# Patient Record
Sex: FEMALE | Race: WHITE | NOT HISPANIC OR LATINO | Employment: PART TIME | URBAN - METROPOLITAN AREA
[De-identification: names, ages, dates, MRNs, and addresses within clinical notes are randomized per-mention and may not be internally consistent; named-entity substitution may affect disease eponyms.]

---

## 2023-12-14 ENCOUNTER — TELEPHONE (OUTPATIENT)
Dept: TRANSPLANT | Facility: CLINIC | Age: 63
End: 2023-12-14

## 2023-12-14 DIAGNOSIS — Z79.899 POLYPHARMACY: Primary | ICD-10-CM

## 2023-12-14 DIAGNOSIS — R06.82 TACHYPNEA: ICD-10-CM

## 2023-12-14 DIAGNOSIS — I27.9 CHRONIC PULMONARY HEART DISEASE: ICD-10-CM

## 2024-01-11 ENCOUNTER — LAB VISIT (OUTPATIENT)
Dept: LAB | Facility: HOSPITAL | Age: 64
End: 2024-01-11
Attending: INTERNAL MEDICINE
Payer: COMMERCIAL

## 2024-01-11 ENCOUNTER — PATIENT MESSAGE (OUTPATIENT)
Dept: TRANSPLANT | Facility: CLINIC | Age: 64
End: 2024-01-11

## 2024-01-11 ENCOUNTER — OFFICE VISIT (OUTPATIENT)
Dept: TRANSPLANT | Facility: CLINIC | Age: 64
End: 2024-01-11
Attending: INTERNAL MEDICINE
Payer: COMMERCIAL

## 2024-01-11 ENCOUNTER — HOSPITAL ENCOUNTER (OUTPATIENT)
Dept: PULMONOLOGY | Facility: CLINIC | Age: 64
Discharge: HOME | End: 2024-01-11
Attending: INTERNAL MEDICINE
Payer: COMMERCIAL

## 2024-01-11 VITALS — BODY MASS INDEX: 26.93 KG/M2 | HEIGHT: 63 IN | WEIGHT: 152 LBS

## 2024-01-11 VITALS
HEIGHT: 63 IN | SYSTOLIC BLOOD PRESSURE: 112 MMHG | HEART RATE: 79 BPM | OXYGEN SATURATION: 98 % | DIASTOLIC BLOOD PRESSURE: 78 MMHG | WEIGHT: 159.81 LBS | BODY MASS INDEX: 28.32 KG/M2

## 2024-01-11 DIAGNOSIS — R76.8 POSITIVE ANA (ANTINUCLEAR ANTIBODY): ICD-10-CM

## 2024-01-11 DIAGNOSIS — I73.00 RAYNAUD'S DISEASE WITHOUT GANGRENE: ICD-10-CM

## 2024-01-11 DIAGNOSIS — Z85.3 HISTORY OF BREAST CANCER: ICD-10-CM

## 2024-01-11 DIAGNOSIS — E03.9 HYPOTHYROIDISM, UNSPECIFIED TYPE: ICD-10-CM

## 2024-01-11 DIAGNOSIS — R06.82 TACHYPNEA: ICD-10-CM

## 2024-01-11 DIAGNOSIS — Z95.828 PRESENCE OF IVC FILTER: ICD-10-CM

## 2024-01-11 DIAGNOSIS — Z86.718 HISTORY OF DEEP VENOUS THROMBOSIS: ICD-10-CM

## 2024-01-11 DIAGNOSIS — I27.20 PULMONARY HYPERTENSION: Primary | ICD-10-CM

## 2024-01-11 DIAGNOSIS — Z79.899 POLYPHARMACY: ICD-10-CM

## 2024-01-11 DIAGNOSIS — I27.9 CHRONIC PULMONARY HEART DISEASE: ICD-10-CM

## 2024-01-11 DIAGNOSIS — I10 PRIMARY HYPERTENSION: ICD-10-CM

## 2024-01-11 LAB
ALBUMIN SERPL BCP-MCNC: 3.5 G/DL (ref 3.5–5.2)
ALP SERPL-CCNC: 83 U/L (ref 55–135)
ALT SERPL W/O P-5'-P-CCNC: 10 U/L (ref 10–44)
ANION GAP SERPL CALC-SCNC: 10 MMOL/L (ref 8–16)
AST SERPL-CCNC: 18 U/L (ref 10–40)
BASOPHILS # BLD AUTO: 0.03 K/UL (ref 0–0.2)
BASOPHILS NFR BLD: 0.3 % (ref 0–1.9)
BILIRUB SERPL-MCNC: 0.9 MG/DL (ref 0.1–1)
BNP SERPL-MCNC: 15 PG/ML (ref 0–99)
BUN SERPL-MCNC: 12 MG/DL (ref 8–23)
CALCIUM SERPL-MCNC: 9.5 MG/DL (ref 8.7–10.5)
CHLORIDE SERPL-SCNC: 99 MMOL/L (ref 95–110)
CO2 SERPL-SCNC: 29 MMOL/L (ref 23–29)
CREAT SERPL-MCNC: 1.2 MG/DL (ref 0.5–1.4)
DIFFERENTIAL METHOD BLD: ABNORMAL
EOSINOPHIL # BLD AUTO: 0.2 K/UL (ref 0–0.5)
EOSINOPHIL NFR BLD: 2.5 % (ref 0–8)
ERYTHROCYTE [DISTWIDTH] IN BLOOD BY AUTOMATED COUNT: 11.9 % (ref 11.5–14.5)
EST. GFR  (NO RACE VARIABLE): 51.5 ML/MIN/1.73 M^2
GLUCOSE SERPL-MCNC: 71 MG/DL (ref 70–110)
HCT VFR BLD AUTO: 46.2 % (ref 37–48.5)
HGB BLD-MCNC: 15 G/DL (ref 12–16)
IMM GRANULOCYTES # BLD AUTO: 0.07 K/UL (ref 0–0.04)
IMM GRANULOCYTES NFR BLD AUTO: 0.7 % (ref 0–0.5)
LYMPHOCYTES # BLD AUTO: 1.7 K/UL (ref 1–4.8)
LYMPHOCYTES NFR BLD: 17.5 % (ref 18–48)
MAGNESIUM SERPL-MCNC: 2.2 MG/DL (ref 1.6–2.6)
MCH RBC QN AUTO: 29 PG (ref 27–31)
MCHC RBC AUTO-ENTMCNC: 32.5 G/DL (ref 32–36)
MCV RBC AUTO: 89 FL (ref 82–98)
MONOCYTES # BLD AUTO: 1.1 K/UL (ref 0.3–1)
MONOCYTES NFR BLD: 11.6 % (ref 4–15)
NEUTROPHILS # BLD AUTO: 6.6 K/UL (ref 1.8–7.7)
NEUTROPHILS NFR BLD: 67.4 % (ref 38–73)
NRBC BLD-RTO: 0 /100 WBC
PLATELET # BLD AUTO: 358 K/UL (ref 150–450)
PMV BLD AUTO: 8.7 FL (ref 9.2–12.9)
POTASSIUM SERPL-SCNC: 3.9 MMOL/L (ref 3.5–5.1)
PROT SERPL-MCNC: 7.2 G/DL (ref 6–8.4)
RBC # BLD AUTO: 5.18 M/UL (ref 4–5.4)
SODIUM SERPL-SCNC: 138 MMOL/L (ref 136–145)
WBC # BLD AUTO: 9.74 K/UL (ref 3.9–12.7)

## 2024-01-11 PROCEDURE — 3008F BODY MASS INDEX DOCD: CPT | Mod: CPTII,S$GLB,, | Performed by: INTERNAL MEDICINE

## 2024-01-11 PROCEDURE — 99204 OFFICE O/P NEW MOD 45 MIN: CPT | Mod: S$GLB,,, | Performed by: INTERNAL MEDICINE

## 2024-01-11 PROCEDURE — 1160F RVW MEDS BY RX/DR IN RCRD: CPT | Mod: CPTII,S$GLB,, | Performed by: INTERNAL MEDICINE

## 2024-01-11 PROCEDURE — 85025 COMPLETE CBC W/AUTO DIFF WBC: CPT | Performed by: INTERNAL MEDICINE

## 2024-01-11 PROCEDURE — 83735 ASSAY OF MAGNESIUM: CPT | Performed by: INTERNAL MEDICINE

## 2024-01-11 PROCEDURE — 1159F MED LIST DOCD IN RCRD: CPT | Mod: CPTII,S$GLB,, | Performed by: INTERNAL MEDICINE

## 2024-01-11 PROCEDURE — 80053 COMPREHEN METABOLIC PANEL: CPT | Performed by: INTERNAL MEDICINE

## 2024-01-11 PROCEDURE — 94618 PULMONARY STRESS TESTING: CPT | Mod: S$GLB,,, | Performed by: INTERNAL MEDICINE

## 2024-01-11 PROCEDURE — 3074F SYST BP LT 130 MM HG: CPT | Mod: CPTII,S$GLB,, | Performed by: INTERNAL MEDICINE

## 2024-01-11 PROCEDURE — 99999 PR PBB SHADOW E&M-EST. PATIENT-LVL V: CPT | Mod: PBBFAC,,, | Performed by: INTERNAL MEDICINE

## 2024-01-11 PROCEDURE — 36415 COLL VENOUS BLD VENIPUNCTURE: CPT | Performed by: INTERNAL MEDICINE

## 2024-01-11 PROCEDURE — 83880 ASSAY OF NATRIURETIC PEPTIDE: CPT | Performed by: INTERNAL MEDICINE

## 2024-01-11 PROCEDURE — 3078F DIAST BP <80 MM HG: CPT | Mod: CPTII,S$GLB,, | Performed by: INTERNAL MEDICINE

## 2024-01-11 RX ORDER — AMITRIPTYLINE HYDROCHLORIDE 10 MG/1
1 TABLET, FILM COATED ORAL NIGHTLY
COMMUNITY
Start: 2019-10-16

## 2024-01-11 RX ORDER — SPIRONOLACTONE 25 MG/1
25 TABLET ORAL DAILY
COMMUNITY

## 2024-01-11 RX ORDER — FUROSEMIDE 20 MG/1
20 TABLET ORAL DAILY
COMMUNITY

## 2024-01-11 RX ORDER — LEVOTHYROXINE SODIUM 100 UG/1
1 TABLET ORAL DAILY
COMMUNITY
Start: 2007-01-10

## 2024-01-11 RX ORDER — AMOXICILLIN 500 MG/1
TABLET, FILM COATED ORAL
COMMUNITY
Start: 2023-08-15

## 2024-01-11 RX ORDER — BUDESONIDE 0.5 MG/2ML
INHALANT ORAL
COMMUNITY

## 2024-01-11 RX ORDER — LORAZEPAM 1 MG/1
1 TABLET ORAL EVERY 8 HOURS PRN
COMMUNITY
Start: 2008-01-10

## 2024-01-11 RX ORDER — FAMOTIDINE 40 MG/1
40 TABLET, FILM COATED ORAL EVERY MORNING
COMMUNITY

## 2024-01-11 RX ORDER — BUPROPION HYDROCHLORIDE 150 MG/1
1 TABLET ORAL DAILY
COMMUNITY
Start: 2019-10-18

## 2024-01-11 RX ORDER — DOCUSATE SODIUM 100 MG/1
CAPSULE, LIQUID FILLED ORAL
COMMUNITY
Start: 2019-08-25

## 2024-01-11 RX ORDER — FLUTICASONE PROPIONATE 50 MCG
2 SPRAY, SUSPENSION (ML) NASAL DAILY
COMMUNITY
Start: 2012-01-11

## 2024-01-11 RX ORDER — NEBIVOLOL 5 MG/1
5 TABLET ORAL DAILY
COMMUNITY
End: 2024-05-10 | Stop reason: SDUPTHER

## 2024-01-11 RX ORDER — INCONTINENCE PAD,LINER,DISP
1 EACH MISCELLANEOUS DAILY
COMMUNITY
Start: 2019-09-16

## 2024-01-11 NOTE — PROCEDURES
Nat Torres is a 63 y.o.  female patient, who presents for a 6 minute walk test ordered by MD Lucy.  The diagnosis is Qualify for Oxygen, Pulmonary Hypertension.  The patient's BMI is 26.9 kg/m2.  Predicted distance (lower limit of normal) is 354.51 meters.      Test Results:    The test was completed with stops. The patient stopped 1 time for a total of 18 seconds. The total time walked was 342 seconds. During walking, the patient reported:  Chest pain, Dyspnea. The patient used no assistive devices during testing.     01/11/2024---------Distance: 335.28 meters (1100 feet)     O2 Sat % Supplemental Oxygen Heart Rate Blood Pressure Hema Scale   Pre-exercise  (Resting) 99 % Room Air 83 bpm 106/66 mmHg 3   During Exercise 96 % Room Air 84 bpm 126/74 mmHg 4   Post-exercise  (Recovery) 97 % Room Air  81 bpm       Recovery Time: 123 seconds    Performing nurse/tech: Abimbola MCLEOD      PREVIOUS STUDY:   The patient has not had a previous study.      CLINICAL INTERPRETATION:  Six minute walk distance is 335.28 meters (1100 feet) with somewhat heavy dyspnea.  During exercise, there was no significant desaturation while breathing room air.  Both blood pressure and heart rate remained stable with walking.  The patient reported non-pulmonary symptoms during exercise.  No previous study performed.  Based upon age and body mass index, exercise capacity is less than predicted.

## 2024-02-17 PROBLEM — E03.9 HYPOTHYROIDISM: Status: ACTIVE | Noted: 2024-02-17

## 2024-02-17 PROBLEM — I10 PRIMARY HYPERTENSION: Status: ACTIVE | Noted: 2024-02-17

## 2024-02-17 PROBLEM — R76.8 POSITIVE ANA (ANTINUCLEAR ANTIBODY): Status: ACTIVE | Noted: 2024-02-17

## 2024-02-17 PROBLEM — Z95.828 PRESENCE OF IVC FILTER: Status: ACTIVE | Noted: 2024-02-17

## 2024-02-17 PROBLEM — I27.20 PULMONARY HYPERTENSION: Status: ACTIVE | Noted: 2024-02-17

## 2024-02-17 PROBLEM — Z86.718 HISTORY OF DEEP VENOUS THROMBOSIS: Status: ACTIVE | Noted: 2024-02-17

## 2024-02-17 PROBLEM — I73.00 RAYNAUD'S DISEASE WITHOUT GANGRENE: Status: ACTIVE | Noted: 2024-02-17

## 2024-02-17 PROBLEM — Z85.3 HISTORY OF BREAST CANCER: Status: ACTIVE | Noted: 2024-02-17

## 2024-02-18 NOTE — PROGRESS NOTES
Subjective:     Patient ID:  Nat Torres is a 63 y.o. female who presents for evaluation of Consult and Pulmonary Hypertension    HPI:  64 yo WF referred by Dr. Luc Ayala for evaluation of possible pulmonary hypertension.  On September 14, 2022 she noted the development of dyspnea on exertion walking with her .  Prior to that she would noted only occasionally when she walked her dog.  She saw Dr. Blas who referred her to physicians [facility], Dr. Beverly who performed a right heart catheterization and told her it was normal--by report pulmonary artery 37/28, mean 32; wedge 16 and PVR reported at 2.8--these findings demonstrate pulmonary hypertension.  I do not have the tracings or the full report.  She underwent an evaluation for ischemia with a negative stress test.  On November 30, 2023 she had a CT calcium score performed that was negative.  She also had a CT angiogram that was performed that reportedly was negative.    Past history includes a positive DOMINIC since 1985 with a speckled pattern noted in December of 2022 at a titer of 1:160.  She had a complicated abdominal surgery with lysis of adhesions was perforation of the left femoral and left iliac arteries.  She had deep venous thrombosis postop and underwent an IVC filter placement June of 1993.  She underwent V/Q scan on January 12, 2023 that was negative for evidence of pulmonary embolization or CTEPH by report, PFTs that demonstrated mild restrictive disease and rheumatology evaluation where she was felt to not have active collagen vascular disease.  She has a personal history of Raynaud's and a family history of CREST involving her aunt.    At this time she reports shortness breath limits her walking at less than 1 block.  She sleeps on 1 pillow and occasionally wakes up having a sit up to catch her breath.  She notes occasional instances where heart rate might be fast but only on some occasions as it irregular.  It tends to gradually  "resolve.  She had a monitor with cis but is not certain of the result.  She received metoprolol from Dr. Beverly.    PAST MEDICAL HISTORY:  Left extremity deep venous thrombosis  Peptic ulcer disease   Chronic venous insufficiency  Hypertension  Raynaud's  Low positive DOMINIC  History breast cancer    OPERATIONS:  Lysis of adhesions was in the abdomen  complicated by perforation left femoral and left iliac arteries  Placement of IVC filter 1993 Dr. Kennedy  Appendectomy and removal of ovarian cyst  Hysterectomy  Surgery on the right wrist   Surgery rotator cuff 2013  Double mastectomy due to genetic risk and breast cancer on right    SOCIAL HISTORY  She is never smoked cigarettes; consumes at most 1-2 oz of alcohol per day; no illicit drug use    FAMILY HISTORY:  Aunt  of CREST    ROS   Negative except as mentioned HPI  Objective:   Physical Exam  Constitutional:       General: She is not in acute distress.     Appearance: She is well-developed. She is not ill-appearing, toxic-appearing or diaphoretic.      Comments: /78 (BP Location: Left arm, Patient Position: Sitting, BP Method: Medium (Automatic))   Pulse 79   Ht 5' 3" (1.6 m)   Wt 72.5 kg (159 lb 13.3 oz)   SpO2 98%   BMI 28.31 kg/m²   Friendly female in no acute distress   HENT:      Head: Normocephalic and atraumatic.   Eyes:      General: No scleral icterus.        Right eye: No discharge.         Left eye: No discharge.      Conjunctiva/sclera: Conjunctivae normal.   Neck:      Thyroid: No thyromegaly.      Vascular: No JVD.      Trachea: No tracheal deviation.   Cardiovascular:      Rate and Rhythm: Normal rate and regular rhythm.      Heart sounds: Normal heart sounds. No murmur heard.     No gallop.      Comments: No right ventricular lift; normal 2nd heart sound  Pulmonary:      Effort: Pulmonary effort is normal.      Breath sounds: Normal breath sounds.   Abdominal:      General: Bowel sounds are normal. There is " no distension.      Palpations: Abdomen is soft. There is no mass.      Tenderness: There is no abdominal tenderness. There is no guarding or rebound.   Musculoskeletal:         General: No tenderness.      Right lower leg: No edema.      Left lower leg: No edema.   Skin:     General: Skin is warm and dry.   Neurological:      General: No focal deficit present.      Mental Status: She is alert and oriented to person, place, and time. Mental status is at baseline.   Psychiatric:         Mood and Affect: Mood normal.         Behavior: Behavior normal.         Thought Content: Thought content normal.         Judgment: Judgment normal.        Labs reviewed as follows:  Lab Results   Component Value Date    BNP 15 01/11/2024     01/11/2024    K 3.9 01/11/2024    MG 2.2 01/11/2024    CL 99 01/11/2024    CO2 29 01/11/2024    BUN 12 01/11/2024    CREATININE 1.2 01/11/2024    GLU 71 01/11/2024    AST 18 01/11/2024    ALT 10 01/11/2024    ALBUMIN 3.5 01/11/2024    PROT 7.2 01/11/2024    BILITOT 0.9 01/11/2024    WBC 9.74 01/11/2024    HGB 15.0 01/11/2024    HCT 46.2 01/11/2024     01/11/2024       Assessment:     1. Pulmonary hypertension    2. Raynaud's disease without gangrene    3. Positive DOMINIC (antinuclear antibody) 1:160 speckled pattern    4. History of deep venous thrombosis    5. Presence of IVC filter    6. Primary hypertension    7. Hypothyroidism, unspecified type    8. History of breast cancer      Plan:   Obtain echocardiogram  Discussed with patient repeating a right heart catheterization and performing exercise if pulmonary artery pressures are normal at rest to determine if she has significant diastolic dysfunction that might result in her symptoms.  I would reviewed the low risk of this procedure and the method of performance via the right arm or right neck.  We discussed the risk of complications all of which are low and consent obtained.  I discussed the recommendations are to remove IVC  filters.  I have recommended she see Dr. Lorne Mena for his opinion and if he is in agreement to attempt retrieval.  I also explained that it might not be possible to retrieve since it has been present since 1993 but I still think it is worthwhile to at least see him for an opinion.  I discussed the complications of iliac venous thrombosis in patients with IVC filters.

## 2024-02-19 ENCOUNTER — TELEPHONE (OUTPATIENT)
Dept: TRANSPLANT | Facility: CLINIC | Age: 64
End: 2024-02-19
Payer: COMMERCIAL

## 2024-02-21 ENCOUNTER — HOSPITAL ENCOUNTER (OUTPATIENT)
Facility: HOSPITAL | Age: 64
Discharge: HOME | End: 2024-02-21
Attending: INTERNAL MEDICINE | Admitting: INTERNAL MEDICINE
Payer: COMMERCIAL

## 2024-02-21 ENCOUNTER — HOSPITAL ENCOUNTER (OUTPATIENT)
Dept: CARDIOLOGY | Facility: HOSPITAL | Age: 64
Discharge: HOME | End: 2024-02-21
Attending: INTERNAL MEDICINE
Payer: COMMERCIAL

## 2024-02-21 VITALS
DIASTOLIC BLOOD PRESSURE: 78 MMHG | BODY MASS INDEX: 26.93 KG/M2 | HEIGHT: 63 IN | WEIGHT: 152 LBS | HEART RATE: 70 BPM | SYSTOLIC BLOOD PRESSURE: 112 MMHG

## 2024-02-21 VITALS
OXYGEN SATURATION: 95 % | HEART RATE: 69 BPM | DIASTOLIC BLOOD PRESSURE: 85 MMHG | RESPIRATION RATE: 21 BRPM | TEMPERATURE: 98 F | SYSTOLIC BLOOD PRESSURE: 121 MMHG

## 2024-02-21 DIAGNOSIS — R06.09 DOE (DYSPNEA ON EXERTION): Primary | ICD-10-CM

## 2024-02-21 DIAGNOSIS — I27.20 PULMONARY HYPERTENSION: ICD-10-CM

## 2024-02-21 DIAGNOSIS — I27.20 PULMONARY HTN: ICD-10-CM

## 2024-02-21 LAB
ASCENDING AORTA: 3.15 CM
AV INDEX (PROSTH): 0.79
AV MEAN GRADIENT: 5 MMHG
AV PEAK GRADIENT: 10 MMHG
AV VALVE AREA BY VELOCITY RATIO: 2.38 CM²
AV VALVE AREA: 2.51 CM²
AV VELOCITY RATIO: 0.75
BSA FOR ECHO PROCEDURE: 1.75 M2
CV ECHO LV RWT: 0.41 CM
DOP CALC AO PEAK VEL: 1.61 M/S
DOP CALC AO VTI: 35.5 CM
DOP CALC LVOT AREA: 3.2 CM2
DOP CALC LVOT DIAMETER: 2.01 CM
DOP CALC LVOT PEAK VEL: 1.21 M/S
DOP CALC LVOT STROKE VOLUME: 89.25 CM3
DOP CALCLVOT PEAK VEL VTI: 28.14 CM
E WAVE DECELERATION TIME: 133.32 MSEC
E/A RATIO: 0.81
E/E' RATIO: 9.41 M/S
ECHO LV POSTERIOR WALL: 0.89 CM (ref 0.6–1.1)
EJECTION FRACTION: 63 %
FRACTIONAL SHORTENING: 35 % (ref 28–44)
INTERVENTRICULAR SEPTUM: 0.7 CM (ref 0.6–1.1)
IVRT: 94.2 MSEC
LA MAJOR: 3.89 CM
LA MINOR: 4.02 CM
LA WIDTH: 3.48 CM
LEFT ATRIUM SIZE: 3.06 CM
LEFT ATRIUM VOLUME INDEX MOD: 20.1 ML/M2
LEFT ATRIUM VOLUME INDEX: 20.8 ML/M2
LEFT ATRIUM VOLUME MOD: 34.55 CM3
LEFT ATRIUM VOLUME: 35.79 CM3
LEFT INTERNAL DIMENSION IN SYSTOLE: 2.87 CM (ref 2.1–4)
LEFT VENTRICLE DIASTOLIC VOLUME INDEX: 50.65 ML/M2
LEFT VENTRICLE DIASTOLIC VOLUME: 87.11 ML
LEFT VENTRICLE MASS INDEX: 63 G/M2
LEFT VENTRICLE SYSTOLIC VOLUME INDEX: 18.2 ML/M2
LEFT VENTRICLE SYSTOLIC VOLUME: 31.3 ML
LEFT VENTRICULAR INTERNAL DIMENSION IN DIASTOLE: 4.39 CM (ref 3.5–6)
LEFT VENTRICULAR MASS: 108.13 G
LV LATERAL E/E' RATIO: 8.89 M/S
LV SEPTAL E/E' RATIO: 10 M/S
MV A" WAVE DURATION": 17.41 MSEC
MV PEAK A VEL: 0.99 M/S
MV PEAK E VEL: 0.8 M/S
MV STENOSIS PRESSURE HALF TIME: 38.66 MS
MV VALVE AREA P 1/2 METHOD: 5.69 CM2
PISA TR MAX VEL: 2.67 M/S
PULM VEIN S/D RATIO: 1.74
PV PEAK D VEL: 0.31 M/S
PV PEAK S VEL: 0.54 M/S
RA MAJOR: 4.08 CM
RA PRESSURE ESTIMATED: 3 MMHG
RA WIDTH: 3.2 CM
RIGHT VENTRICULAR END-DIASTOLIC DIMENSION: 3.01 CM
RV TB RVSP: 6 MMHG
SINUS: 3.22 CM
STJ: 2.79 CM
TDI LATERAL: 0.09 M/S
TDI SEPTAL: 0.08 M/S
TDI: 0.09 M/S
TR MAX PG: 29 MMHG
TRICUSPID ANNULAR PLANE SYSTOLIC EXCURSION: 2.28 CM
TV REST PULMONARY ARTERY PRESSURE: 32 MMHG
Z-SCORE OF LEFT VENTRICULAR DIMENSION IN END DIASTOLE: -0.83
Z-SCORE OF LEFT VENTRICULAR DIMENSION IN END SYSTOLE: -0.23

## 2024-02-21 PROCEDURE — 93306 TTE W/DOPPLER COMPLETE: CPT

## 2024-02-21 PROCEDURE — 93306 TTE W/DOPPLER COMPLETE: CPT | Mod: 26,,, | Performed by: INTERNAL MEDICINE

## 2024-02-21 PROCEDURE — 93451 RIGHT HEART CATH: CPT | Performed by: INTERNAL MEDICINE

## 2024-02-21 PROCEDURE — C1894 INTRO/SHEATH, NON-LASER: HCPCS | Performed by: INTERNAL MEDICINE

## 2024-02-21 PROCEDURE — C1751 CATH, INF, PER/CENT/MIDLINE: HCPCS | Performed by: INTERNAL MEDICINE

## 2024-02-21 PROCEDURE — 93451 RIGHT HEART CATH: CPT | Mod: 26,,, | Performed by: INTERNAL MEDICINE

## 2024-02-21 PROCEDURE — 25000003 PHARM REV CODE 250: Performed by: INTERNAL MEDICINE

## 2024-02-21 RX ORDER — LIDOCAINE HYDROCHLORIDE 20 MG/ML
INJECTION, SOLUTION EPIDURAL; INFILTRATION; INTRACAUDAL; PERINEURAL
Status: DISCONTINUED | OUTPATIENT
Start: 2024-02-21 | End: 2024-02-21 | Stop reason: HOSPADM

## 2024-02-21 RX ORDER — AZELASTINE 1 MG/ML
1 SPRAY, METERED NASAL DAILY
COMMUNITY

## 2024-02-21 NOTE — PLAN OF CARE
Patient received back to room 7C s/p [facility] at 1500. Report received from GREGORY Farnsworth.  Patient is aaox4. Vss. Right neck with dry gauze/tegaderm dressing intact. No bleeding noted. Patient denies any complaints. AVS printed. Home care instructions reviewed with patient. Questions answered. Patient ambulatory with daughter for discharge.

## 2024-02-21 NOTE — PLAN OF CARE
Patient  admitted to sscu room 7 C accompanied by daughter. Patient  aao x4. Vss. No distress noted. Patient without any complaints. Plan of care reviewed with patient. Pre procedure admission completed.

## 2024-02-21 NOTE — H&P
[facility] - Short Stay Cardiac Unit  Heart Transplant  H&P    Patient Name: Nat Torres  MRN: [id number]  Admission Date: 2/21/2024  Attending Physician: Lj Ayala Jr.*  Primary Care Provider: Corey Blas MD  Principal Problem:<principal problem not specified>    Subjective:     History of Present Illness:  62 yo WF referred by Dr. Luc Ayala for evaluation of possible pulmonary hypertension.  On September 14, 2022 she noted the development of dyspnea on exertion walking with her .  Prior to that she would noted only occasionally when she walked her dog.  She saw Dr. Blas who referred her to physicians [facility], Dr. Beverly who performed a right heart catheterization and told her it was normal--by report pulmonary artery 37/28, mean 32; wedge 16 and PVR reported at 2.8--these findings demonstrate pulmonary hypertension.  I do not have the tracings or the full report.  She underwent an evaluation for ischemia with a negative stress test.  On November 30, 2023 she had a CT calcium score performed that was negative.  She also had a CT angiogram that was performed that reportedly was negative.     Past history includes a positive DOMINIC since 1985 with a speckled pattern noted in December of 2022 at a titer of 1:160.  She had a complicated abdominal surgery with lysis of adhesions was perforation of the left femoral and left iliac arteries.  She had deep venous thrombosis postop and underwent an IVC filter placement June of 1993.  She underwent V/Q scan on January 12, 2023 that was negative for evidence of pulmonary embolization or CTEPH by report, PFTs that demonstrated mild restrictive disease and rheumatology evaluation where she was felt to not have active collagen vascular disease.  She has a personal history of Raynaud's and a family history of CREST involving her aunt.     At this time she reports shortness breath limits her walking at less than 1 block.  She sleeps on 1 pillow and  occasionally wakes up having a sit up to catch her breath.  She notes occasional instances where heart rate might be fast but only on some occasions as it irregular.  It tends to gradually resolve.  She had a monitor with cis but is not certain of the result.  She received metoprolol from Dr. Beverly.    Presenting today for RHC.    Past Medical History:   Diagnosis Date    History of breast cancer 02/17/2024    History of deep venous thrombosis 02/17/2024    Hypothyroidism 02/17/2024    Peptic ulcer disease with hemorrhage 2013    Positive DOMINIC (antinuclear antibody) 1:160 speckled pattern 02/17/2024    Presence of IVC filter 02/17/2024    Primary hypertension 02/17/2024    Pulmonary hypertension 02/17/2024    Raynaud's disease without gangrene 02/17/2024       Past Surgical History:   Procedure Laterality Date    APPENDECTOMY      BILATERAL MASTECTOMY Bilateral     Breast cancer on right    BREAST SURGERY      DEN FILTER PLACEMENT      HYSTERECTOMY      LYSIS OF ADHESIONS      Removal of ovarian cyst      ROTATOR CUFF REPAIR      WRIST SURGERY Right        Review of patient's allergies indicates:   Allergen Reactions    Artemisinin     Percocet [oxycodone-acetaminophen]     Percodan [oxycodone-aspirin]        No current facility-administered medications for this encounter.     Family History    None       Tobacco Use    Smoking status: Never    Smokeless tobacco: Never   Substance and Sexual Activity    Alcohol use: Yes     Comment: 1-2 oz per day    Drug use: Never    Sexual activity: Not on file     Review of Systems   All other systems reviewed and are negative.    Objective:     Vital Signs (Most Recent):    Vital Signs (24h Range):  Pulse:  [70] 70  BP: (112)/(78) 112/78     No data found.  There is no height or weight on file to calculate BMI.    No intake or output data in the 24 hours ending 02/21/24 1124       Physical Exam  Constitutional:       Appearance: Normal appearance.   HENT:      Head:  "Normocephalic.      Mouth/Throat:      Mouth: Mucous membranes are moist.   Eyes:      Extraocular Movements: Extraocular movements intact.   Cardiovascular:      Rate and Rhythm: Normal rate and regular rhythm.      Pulses: Normal pulses.   Pulmonary:      Effort: Pulmonary effort is normal. No respiratory distress.   Abdominal:      General: Abdomen is flat.      Palpations: Abdomen is soft.   Musculoskeletal:      Cervical back: Neck supple.      Right lower leg: No edema.      Left lower leg: No edema.   Skin:     General: Skin is warm.   Neurological:      Mental Status: She is alert and oriented to person, place, and time.            Significant Labs:  CBC:  Recent Labs   Lab 02/21/24  0858   WBC 5.44   RBC 4.97   HGB 14.1   HCT 44.2      MCV 89   MCH 28.4   MCHC 31.9*     BNP:  Recent Labs   Lab 02/21/24  0858   BNP 41     CMP:  Recent Labs   Lab 02/21/24  0858   GLU 77   CALCIUM 8.8      K 3.7   CO2 29      BUN 6*   CREATININE 0.9      Coagulation:   No results for input(s): "PT", "INR", "APTT" in the last 168 hours.  LDH:  No results for input(s): "LDH" in the last 72 hours.  Microbiology:  Microbiology Results (last 7 days)       ** No results found for the last 168 hours. **            I have reviewed all pertinent labs within the past 24 hours.    Diagnostic Results:  TTE 2/21/24    Left Ventricle: There is normal systolic function with a visually estimated ejection fraction of 60 - 65%. Ejection fraction by visual approximation is 63%. There is normal diastolic function.    Right Ventricle: Normal right ventricular cavity size. Wall thickness is normal. Right ventricle wall motion  is normal. Systolic function is normal.    Mitral Valve: There is mild regurgitation.    Pulmonary Artery: The estimated pulmonary artery systolic pressure is 32 mmHg.    IVC/SVC: Normal venous pressure at 3 mmHg.    Pericardium: There is a trivial posterior effusion .    Assessment/Plan:     Pulmonary " hypertension  --RHC  - Access: Right IJ and R brachial  - Catheters: PA catheter  - Creatinine/CrCl: 0.9  - INR: N/A  - Allergies: Artemisinin, Percocet, Percodan  - All patient's questions were answered.  -The risks, benefits and alternatives of the procedure were explained to the patient.    - Informed consent was obtained and the patient is agreeable to proceed with the procedure.      Case to be discussed with Dr David Ayala MD.    Rey Linares MD  Heart Transplant  [facility] - Short Stay Cardiac Unit

## 2024-02-21 NOTE — Clinical Note
The PA catheter was repositioned to the main pulmonary artery. Hemodynamics were performed. DURING. THERMAL DILUTIONS PREFORMED

## 2024-02-21 NOTE — Clinical Note
The PA catheter was repositioned to the main pulmonary artery. Hemodynamics were performed. O2 saturation was measured at 71%. co=4.11  ci=2.39

## 2024-02-21 NOTE — DISCHARGE INSTRUCTIONS

## 2024-02-21 NOTE — HPI
62 yo WF referred by Dr. Luc Ayala for evaluation of possible pulmonary hypertension.  On September 14, 2022 she noted the development of dyspnea on exertion walking with her .  Prior to that she would noted only occasionally when she walked her dog.  She saw Dr. Blas who referred her to physicians [facility], Dr. Beverly who performed a right heart catheterization and told her it was normal--by report pulmonary artery 37/28, mean 32; wedge 16 and PVR reported at 2.8--these findings demonstrate pulmonary hypertension.  I do not have the tracings or the full report.  She underwent an evaluation for ischemia with a negative stress test.  On November 30, 2023 she had a CT calcium score performed that was negative.  She also had a CT angiogram that was performed that reportedly was negative.     Past history includes a positive DOMINIC since 1985 with a speckled pattern noted in December of 2022 at a titer of 1:160.  She had a complicated abdominal surgery with lysis of adhesions was perforation of the left femoral and left iliac arteries.  She had deep venous thrombosis postop and underwent an IVC filter placement June of 1993.  She underwent V/Q scan on January 12, 2023 that was negative for evidence of pulmonary embolization or CTEPH by report, PFTs that demonstrated mild restrictive disease and rheumatology evaluation where she was felt to not have active collagen vascular disease.  She has a personal history of Raynaud's and a family history of CREST involving her aunt.     At this time she reports shortness breath limits her walking at less than 1 block.  She sleeps on 1 pillow and occasionally wakes up having a sit up to catch her breath.  She notes occasional instances where heart rate might be fast but only on some occasions as it irregular.  It tends to gradually resolve.  She had a monitor with cis but is not certain of the result.  She received metoprolol from Dr. Beverly.    Presenting today for  C.

## 2024-02-21 NOTE — SUBJECTIVE & OBJECTIVE
Past Medical History:   Diagnosis Date    History of breast cancer 02/17/2024    History of deep venous thrombosis 02/17/2024    Hypothyroidism 02/17/2024    Peptic ulcer disease with hemorrhage 2013    Positive DOMINIC (antinuclear antibody) 1:160 speckled pattern 02/17/2024    Presence of IVC filter 02/17/2024    Primary hypertension 02/17/2024    Pulmonary hypertension 02/17/2024    Raynaud's disease without gangrene 02/17/2024       Past Surgical History:   Procedure Laterality Date    APPENDECTOMY      BILATERAL MASTECTOMY Bilateral     Breast cancer on right    BREAST SURGERY      DEN FILTER PLACEMENT      HYSTERECTOMY      LYSIS OF ADHESIONS      Removal of ovarian cyst      ROTATOR CUFF REPAIR      WRIST SURGERY Right        Review of patient's allergies indicates:   Allergen Reactions    Artemisinin     Percocet [oxycodone-acetaminophen]     Percodan [oxycodone-aspirin]        No current facility-administered medications for this encounter.     Family History    None       Tobacco Use    Smoking status: Never    Smokeless tobacco: Never   Substance and Sexual Activity    Alcohol use: Yes     Comment: 1-2 oz per day    Drug use: Never    Sexual activity: Not on file     Review of Systems   All other systems reviewed and are negative.    Objective:     Vital Signs (Most Recent):    Vital Signs (24h Range):  Pulse:  [70] 70  BP: (112)/(78) 112/78     No data found.  There is no height or weight on file to calculate BMI.    No intake or output data in the 24 hours ending 02/21/24 1124       Physical Exam  Constitutional:       Appearance: Normal appearance.   HENT:      Head: Normocephalic.      Mouth/Throat:      Mouth: Mucous membranes are moist.   Eyes:      Extraocular Movements: Extraocular movements intact.   Cardiovascular:      Rate and Rhythm: Normal rate and regular rhythm.      Pulses: Normal pulses.   Pulmonary:      Effort: Pulmonary effort is normal. No respiratory distress.   Abdominal:       "General: Abdomen is flat.      Palpations: Abdomen is soft.   Musculoskeletal:      Cervical back: Neck supple.      Right lower leg: No edema.      Left lower leg: No edema.   Skin:     General: Skin is warm.   Neurological:      Mental Status: She is alert and oriented to person, place, and time.            Significant Labs:  CBC:  Recent Labs   Lab 02/21/24  0858   WBC 5.44   RBC 4.97   HGB 14.1   HCT 44.2      MCV 89   MCH 28.4   MCHC 31.9*     BNP:  Recent Labs   Lab 02/21/24  0858   BNP 41     CMP:  Recent Labs   Lab 02/21/24  0858   GLU 77   CALCIUM 8.8      K 3.7   CO2 29      BUN 6*   CREATININE 0.9      Coagulation:   No results for input(s): "PT", "INR", "APTT" in the last 168 hours.  LDH:  No results for input(s): "LDH" in the last 72 hours.  Microbiology:  Microbiology Results (last 7 days)       ** No results found for the last 168 hours. **            I have reviewed all pertinent labs within the past 24 hours.    Diagnostic Results:  TTE 2/21/24    Left Ventricle: There is normal systolic function with a visually estimated ejection fraction of 60 - 65%. Ejection fraction by visual approximation is 63%. There is normal diastolic function.    Right Ventricle: Normal right ventricular cavity size. Wall thickness is normal. Right ventricle wall motion  is normal. Systolic function is normal.    Mitral Valve: There is mild regurgitation.    Pulmonary Artery: The estimated pulmonary artery systolic pressure is 32 mmHg.    IVC/SVC: Normal venous pressure at 3 mmHg.    Pericardium: There is a trivial posterior effusion .    "

## 2024-02-21 NOTE — ASSESSMENT & PLAN NOTE
--RHC  - Access: Right IJ and R brachial  - Catheters: PA catheter  - Creatinine/CrCl: 0.9  - INR: N/A  - Allergies: Artemisinin, Percocet, Percodan  - All patient's questions were answered.  -The risks, benefits and alternatives of the procedure were explained to the patient.    - Informed consent was obtained and the patient is agreeable to proceed with the procedure.

## 2024-02-21 NOTE — Clinical Note
The PA catheter was repositioned to the pulmonary wedge. Hemodynamics were performed. Thermal dilutions preformed

## 2024-02-21 NOTE — Clinical Note
The right brachial and right neck was prepped. The site was prepped with ChloraPrep. The patient was draped.

## 2024-03-06 ENCOUNTER — HOSPITAL ENCOUNTER (OUTPATIENT)
Dept: CARDIOLOGY | Facility: HOSPITAL | Age: 64
Discharge: HOME | End: 2024-03-06
Attending: INTERNAL MEDICINE
Payer: COMMERCIAL

## 2024-03-06 VITALS
WEIGHT: 165 LBS | DIASTOLIC BLOOD PRESSURE: 77 MMHG | BODY MASS INDEX: 29.23 KG/M2 | HEART RATE: 74 BPM | SYSTOLIC BLOOD PRESSURE: 112 MMHG | HEIGHT: 63 IN

## 2024-03-06 DIAGNOSIS — R06.09 DOE (DYSPNEA ON EXERTION): ICD-10-CM

## 2024-03-06 LAB
CV STRESS BASE HR: 74 BPM
DIASTOLIC BLOOD PRESSURE: 77 MMHG
OHS CV CPX 1 MINUTE RECOVERY HEART RATE: 100 BPM
OHS CV CPX 85 PERCENT MAX PREDICTED HEART RATE MALE: 135
OHS CV CPX ANAEROBIC THRESHOLD DIASTOLIC BLOOD PRESSURE: 92 MMHG
OHS CV CPX ANAEROBIC THRESHOLD HEART RATE: 98
OHS CV CPX ANAEROBIC THRESHOLD RATE PRESSURE PRODUCT: NORMAL
OHS CV CPX ANAEROBIC THRESHOLD SYSTOLIC BLOOD PRESSURE: 147
OHS CV CPX DATA GRADE - AT: 3.6
OHS CV CPX DATA GRADE - PEAK: 6.5
OHS CV CPX DATA O2 SAT - PEAK: 97
OHS CV CPX DATA O2 SAT - REST: 98
OHS CV CPX DATA SPEED - AT: 2.7
OHS CV CPX DATA SPEED - PEAK: 3.9
OHS CV CPX DATA TIME - AT: 6.2
OHS CV CPX DATA TIME - PEAK: 10
OHS CV CPX DATA VE/VCO2 - AT: 28
OHS CV CPX DATA VE/VCO2 - PEAK: 34
OHS CV CPX DATA VE/VO2 - AT: 25
OHS CV CPX DATA VE/VO2 - PEAK: 35
OHS CV CPX DATA VO2 - AT: 15.2
OHS CV CPX DATA VO2 - PEAK: 22.9
OHS CV CPX DATA VO2 - REST: 4.3
OHS CV CPX FEV1/FVC: 0.64
OHS CV CPX FORCED EXPIRATORY VOLUME: 1.63
OHS CV CPX FORCED VITAL CAPACITY (FVC): 2.55
OHS CV CPX HIGHEST VO: 27.5
OHS CV CPX MAX PREDICTED HEART RATE: 159
OHS CV CPX MAXIMAL VOLUNTARY VENTILATION (MVV) PREDICTED: 65.2
OHS CV CPX MAXIMAL VOLUNTARY VENTILATION (MVV): 24
OHS CV CPX MAXIUMUM EXERCISE VENTILATION (VE MAX): 62.7
OHS CV CPX PATIENT AGE: 61
OHS CV CPX PATIENT HEIGHT IN: 63
OHS CV CPX PATIENT IS FEMALE AGE 11-19: 0
OHS CV CPX PATIENT IS FEMALE AGE GREATER THAN 19: 1
OHS CV CPX PATIENT IS FEMALE AGE LESS THAN 11: 0
OHS CV CPX PATIENT IS FEMALE: 1
OHS CV CPX PATIENT IS MALE AGE 11-25: 0
OHS CV CPX PATIENT IS MALE AGE GREATER THAN 25: 0
OHS CV CPX PATIENT IS MALE AGE LESS THAN 11: 0
OHS CV CPX PATIENT IS MALE GREATER THAN 18: 0
OHS CV CPX PATIENT IS MALE LESS THAN OR EQUAL TO 18: 0
OHS CV CPX PATIENT IS MALE: 0
OHS CV CPX PATIENT WEIGHT RETURNED IN OZ: 2640
OHS CV CPX PEAK DIASTOLIC BLOOD PRESSURE: 79 MMHG
OHS CV CPX PEAK HEAR RATE: 115 BPM
OHS CV CPX PEAK RATE PRESSURE PRODUCT: NORMAL
OHS CV CPX PEAK SYSTOLIC BLOOD PRESSURE: 125 MMHG
OHS CV CPX PERCENT BODY FAT: 25.8
OHS CV CPX PERCENT MAX PREDICTED HEART RATE ACHIEVED: 75
OHS CV CPX PREDICTED VO2: 27.5 ML/KG/MIN
OHS CV CPX RATE PRESSURE PRODUCT PRESENTING: 8288
OHS CV CPX REST PET CO2: 29
OHS CV CPX VE/VCO2 SLOPE: 24.5
STRESS ECHO POST EXERCISE DUR MIN: 10 MINUTES
STRESS ECHO POST EXERCISE DUR SEC: 0 SECONDS
SYSTOLIC BLOOD PRESSURE: 112 MMHG

## 2024-03-06 PROCEDURE — 94621 CARDIOPULM EXERCISE TESTING: CPT

## 2024-03-06 PROCEDURE — 94621 CARDIOPULM EXERCISE TESTING: CPT | Mod: 26,,, | Performed by: INTERNAL MEDICINE

## 2024-03-13 ENCOUNTER — OFFICE VISIT (OUTPATIENT)
Dept: TRANSPLANT | Facility: CLINIC | Age: 64
End: 2024-03-13
Attending: INTERNAL MEDICINE
Payer: COMMERCIAL

## 2024-03-13 VITALS
WEIGHT: 167.56 LBS | OXYGEN SATURATION: 100 % | SYSTOLIC BLOOD PRESSURE: 119 MMHG | BODY MASS INDEX: 29.69 KG/M2 | HEART RATE: 80 BPM | HEIGHT: 63 IN | DIASTOLIC BLOOD PRESSURE: 68 MMHG

## 2024-03-13 DIAGNOSIS — I73.00 RAYNAUD'S DISEASE WITHOUT GANGRENE: ICD-10-CM

## 2024-03-13 DIAGNOSIS — R76.8 POSITIVE ANA (ANTINUCLEAR ANTIBODY): ICD-10-CM

## 2024-03-13 DIAGNOSIS — R06.09 DOE (DYSPNEA ON EXERTION): Primary | ICD-10-CM

## 2024-03-13 DIAGNOSIS — Z82.69 FAMILY HISTORY OF CREST SYNDROME: ICD-10-CM

## 2024-03-13 PROCEDURE — 3074F SYST BP LT 130 MM HG: CPT | Mod: CPTII,S$GLB,, | Performed by: INTERNAL MEDICINE

## 2024-03-13 PROCEDURE — 1160F RVW MEDS BY RX/DR IN RCRD: CPT | Mod: CPTII,S$GLB,, | Performed by: INTERNAL MEDICINE

## 2024-03-13 PROCEDURE — 3008F BODY MASS INDEX DOCD: CPT | Mod: CPTII,S$GLB,, | Performed by: INTERNAL MEDICINE

## 2024-03-13 PROCEDURE — 1159F MED LIST DOCD IN RCRD: CPT | Mod: CPTII,S$GLB,, | Performed by: INTERNAL MEDICINE

## 2024-03-13 PROCEDURE — 3078F DIAST BP <80 MM HG: CPT | Mod: CPTII,S$GLB,, | Performed by: INTERNAL MEDICINE

## 2024-03-13 PROCEDURE — 99999 PR PBB SHADOW E&M-EST. PATIENT-LVL IV: CPT | Mod: PBBFAC,,, | Performed by: INTERNAL MEDICINE

## 2024-03-13 PROCEDURE — 99203 OFFICE O/P NEW LOW 30 MIN: CPT | Mod: S$GLB,,, | Performed by: INTERNAL MEDICINE

## 2024-03-13 NOTE — PROGRESS NOTES
Subjective:     Patient ID:  Nat Torres is a 63 y.o. female who presents for evaluation of Pulmonary Hypertension      ENTER HER RIGHT HEART CATHETERIZATION DATA NOT SURE WHY IT HAS NOT CROSSING OVER AND FINISH NOTE    LEFT LUC MESSAGE ON VOICEMAIL REGARDING REDUCED PULMONARY RESERVE ON CPX AND MY THEORY REGARDING HER SYMPTOMS OUTLINED BELOW    HPI:  64 yo WF referred by Dr. Luc Ayala for evaluation of possible pulmonary hypertension.  On September 14, 2022 she noted the development of dyspnea on exertion walking with her .  Prior to that she would noted only occasionally when she walked her dog.  She saw Dr. Blas who referred her to physicians [facility], Dr. Beverly who performed a right heart catheterization and told her it was normal--by report pulmonary artery 37/28, mean 32; wedge 16 and PVR reported at 2.8--these findings demonstrate pulmonary hypertension.  I do not have the tracings or the full report.  She underwent an evaluation for ischemia with a negative stress test.  On November 30, 2023 she had a CT calcium score performed that was negative.  She also had a CT angiogram that was performed that reportedly was negative.    Past history includes a positive DOMINIC since 1985 with a speckled pattern noted in December of 2022 at a titer of 1:160.  She had a complicated abdominal surgery with lysis of adhesions was perforation of the left femoral and left iliac arteries.  She had deep venous thrombosis postop and underwent an IVC filter placement June of 1993.  She underwent V/Q scan on January 12, 2023 that was negative for evidence of pulmonary embolization or CTEPH by report, PFTs that demonstrated mild restrictive disease and rheumatology evaluation where she was felt to not have active collagen vascular disease.  She has a personal history of Raynaud's and a family history of CREST involving her aunt.    At this time she reports shortness breath limits her walking at less than 1  "block.  She sleeps on 1 pillow and occasionally wakes up having a sit up to catch her breath.  She notes occasional instances where heart rate might be fast but only on some occasions as it irregular.  It tends to gradually resolve.  She had a monitor with cis but is not certain of the result.  She received metoprolol from Dr. Beverly.    I performed a right heart catheterization on her in 2024.  This study was normal both at rest and with exercise (see below).  I made arrangements for her to undergo a CPX and this visit to review the results.  There have been no change in her symptoms or history.    PAST MEDICAL HISTORY:  Left extremity deep venous thrombosis  Peptic ulcer disease   Chronic venous insufficiency  Hypertension  Raynaud's  Low positive DOMINIC  History breast cancer    OPERATIONS:  Lysis of adhesions was in the abdomen  complicated by perforation left femoral and left iliac arteries  Placement of IVC filter 1993 Dr. Kennedy  Appendectomy and removal of ovarian cyst  Hysterectomy  Surgery on the right wrist   Surgery rotator cuff 2013  Double mastectomy due to genetic risk and breast cancer on right    SOCIAL HISTORY  She is never smoked cigarettes; consumes at most 1-2 oz of alcohol per day; no illicit drug use    FAMILY HISTORY:  Aunt  of CREST    ROS  Not repeated today  Objective:   /68 (BP Location: Left arm, Patient Position: Sitting, BP Method: Medium (Automatic))   Pulse 80   Ht 5' 3" (1.6 m)   Wt 76 kg (167 lb 8.8 oz)   SpO2 100%   BMI 29.68 kg/m²   SHE WAS NOT EXAMINED TODAY    Labs reviewed as follows:  Lab Results   Component Value Date    BNP 41 2024     2024    K 3.7 2024    MG 2.2 2024     2024    CO2 29 2024    BUN 6 (L) 2024    CREATININE 0.9 2024    GLU 77 2024    AST 18 2024    ALT 10 2024    ALBUMIN 3.5 2024    PROT 7.2 2024    BILITOT 0.9 2024 "    WBC 5.44 02/21/2024    HGB 14.1 02/21/2024    HCT 44.2 02/21/2024     02/21/2024       3/6/2024 CPX Metabolic Findings    Resting spirometry reveals an FVC = 2.55L which is 88% of predicted, an FEV1 of 1.63L, which is 70% of predicted and an FEV1/FVC ratio of 64%. The MVV = 65 L/min, which is 73% of predicted.     The respiratory exchange ratio (RER) was 1.04, suggesting suboptimal effort.     The breathing reserve is calculated at 4%, which is reduced. Oxygen saturation with exercise remained normal.     The peak VO2 was 22.9 ml/kg/min which is 83% of predicted equating to a functional capacity of 6.54 METS indicating mild functional impairment.     The anaerobic threshold (AT), which occurred at a heart rate of 98bpm, was 15.2 ml/kg/min, which is 55% of the predicted VO2 and is normal.     The peak VO2 Lean was 30.60 ml/kg of lean body weight/min indicating a good prognosis in heart failure.     The VE/VCO2 [place] was 24.5. The Resting PetCO2 was 29.0.       Mild functional impairment associated with a reduced breathing reserve, normal oxygen stauration, suboptimal effort, and a normal AT. These findings are indicative of functional impairment secondary to poor effort, ventilatory impairment.         2/21/2024 RHC  At Rest:    Right atrial pressure is normal.    Pulmonary capillary wedge pressure is normal.    Mean pulmonary artery pressure is 16 mm Hg which is normal.    Be cardiac output and index are normal at 4.11/2.39    Thermodilution cardiac output and index are normal at 5.27/3.07.    Pulmonary vascular resistance is normal (PVR 1.52 Wood units using cardiac output from thermodilution as required for exercise study without direct measurement of oxygen uptake).     On bike at rest:    No significant change in PA or Wedge pressure, both remain normal     With exercise:    PA and wedge pressure changes observed are within expected normal    Excellent augmentation of cardiac output consistent with  good effort.    PVR remained normal falling to 0.65 Wood units.    No desaturation noted.    Exercise limited by shortness of breath.     Right Heart Pressures    RA: 8/ 7/ 6 RV: 29/ 9 PA: 27/ 11/ 16 PWP: 14/ 11/ 8 .   Cardiac output was 5.27  by thermodilution. Cardiac index is 3.07 L/min/m2.   O2 Sat: PA 71%.   Pulmonary vascular resistance: 121.     Sitting on bike at rest wedge 10 mm Hg    With exercise:  PA 36/20 (25)  PCWP 15/20/(17)  Thermodilution CO/CI 12.36/7.19  PVR 52 (0.65 Wood units)    Immediately after exercise  PA 29/15 (20)  PCWP 11/15/(10)     Recommendations    This exercise RHC is normal and without definite evidence for diastolic dysfunction and excellent augmentation of CO.  With exercise PVR falls from 1.52 Wood units (normal) to 0.65 Wood units).  Recommend CPX to better define the culprit for exercise limitation.     2/21/2024 ECHO    Left Ventricle: There is normal systolic function with a visually estimated ejection fraction of 60 - 65%. Ejection fraction by visual approximation is 63%. There is normal diastolic function.    Right Ventricle: Normal right ventricular cavity size. Wall thickness is normal. Right ventricle wall motion  is normal. Systolic function is normal.    Mitral Valve: There is mild regurgitation.    Pulmonary Artery: The estimated pulmonary artery systolic pressure is 32 mmHg.    IVC/SVC: Normal venous pressure at 3 mmHg.    Pericardium: There is a trivial posterior effusion .            Assessment:     1. AGUIRRE (dyspnea on exertion); PAH excluded by RHC rest and exercise on 2/21/24    2. Raynaud's disease without gangrene    3. Positive DOMINIC (antinuclear antibody)    4. Family history of CREST syndrome      Plan:   HER CPX TODAY DEMONSTRATES REDUCED PULMONARY RESERVE.  I WONDER IF HER SYMPTOMS ARE DUE TO HER MILD RESTRICTIVE LUNG DISEASE AND IMPAIRED PULMONARY RESERVE AND MORE OBVIOUS TO THE PATIENT BECAUSE OF HER PRIOR LEVEL OF ACTIVITY.    I RECOMMEND EXERCISE  PROGRAM    SHE DOES NOT REQUIRE FOLLOW UP WITH HTS

## 2024-03-25 PROBLEM — I27.20 PULMONARY HYPERTENSION: Status: RESOLVED | Noted: 2024-02-17 | Resolved: 2024-03-25

## 2024-03-25 NOTE — DISCHARGE SUMMARY
[facility]  Discharge Note  Short Stay    Procedure(s) (LRB):  INSERTION, CATHETER, RIGHT HEART (Right)      OUTCOME: Patient tolerated treatment/procedure well without complication and is now ready for discharge.    DISPOSITION: Home or Self Care    FINAL DIAGNOSIS:  Pulmonary hypertension which after this procedure has been excluded and will be removed from problem list.    FOLLOWUP: In clinic    DISCHARGE INSTRUCTIONS:  Continue prior medications.  Resume previous diet and activity except as directed in patient instructions.  Continue f/u with your physicians as they directed prior to this procedure    TIME SPENT ON DISCHARGE: 10 minutes    RHC findings  At Rest:    Right atrial pressure is normal.    Pulmonary capillary wedge pressure is normal.    Mean pulmonary artery pressure is 16 mm Hg which is normal.    Be cardiac output and index are normal at 4.11/2.39    Thermodilution cardiac output and index are normal at 5.27/3.07.    Pulmonary vascular resistance is normal (PVR 1.52 Wood units using cardiac output from thermodilution as required for exercise study without direct measurement of oxygen uptake).    On bike at rest:    No significant change in PA or Wedge pressure, both remain normal    With exercise:    PA and wedge pressure changes observed are within expected normal    Excellent augmentation of cardiac output consistent with good effort.    PVR remained normal falling to 0.65 Wood units.    No desaturation noted.    Exercise limited by shortness of breath.

## 2024-04-03 ENCOUNTER — OFFICE VISIT (OUTPATIENT)
Dept: PULMONOLOGY | Facility: CLINIC | Age: 64
End: 2024-04-03
Payer: COMMERCIAL

## 2024-04-03 VITALS
HEART RATE: 87 BPM | SYSTOLIC BLOOD PRESSURE: 103 MMHG | HEIGHT: 63 IN | OXYGEN SATURATION: 95 % | BODY MASS INDEX: 30.35 KG/M2 | WEIGHT: 171.31 LBS | DIASTOLIC BLOOD PRESSURE: 70 MMHG

## 2024-04-03 DIAGNOSIS — J84.9 ILD (INTERSTITIAL LUNG DISEASE): ICD-10-CM

## 2024-04-03 DIAGNOSIS — I27.9 CHRONIC PULMONARY HEART DISEASE: ICD-10-CM

## 2024-04-03 DIAGNOSIS — Z86.718 HISTORY OF DEEP VENOUS THROMBOSIS: ICD-10-CM

## 2024-04-03 DIAGNOSIS — I73.00 RAYNAUD'S DISEASE WITHOUT GANGRENE: Primary | ICD-10-CM

## 2024-04-03 PROCEDURE — 3074F SYST BP LT 130 MM HG: CPT | Mod: CPTII,S$GLB,, | Performed by: INTERNAL MEDICINE

## 2024-04-03 PROCEDURE — 99999 PR PBB SHADOW E&M-EST. PATIENT-LVL III: CPT | Mod: PBBFAC,,, | Performed by: INTERNAL MEDICINE

## 2024-04-03 PROCEDURE — 3008F BODY MASS INDEX DOCD: CPT | Mod: CPTII,S$GLB,, | Performed by: INTERNAL MEDICINE

## 2024-04-03 PROCEDURE — 3078F DIAST BP <80 MM HG: CPT | Mod: CPTII,S$GLB,, | Performed by: INTERNAL MEDICINE

## 2024-04-03 PROCEDURE — 99214 OFFICE O/P EST MOD 30 MIN: CPT | Mod: S$GLB,,, | Performed by: INTERNAL MEDICINE

## 2024-04-03 RX ORDER — ASPIRIN 81 MG/1
81 TABLET ORAL DAILY
COMMUNITY

## 2024-04-03 NOTE — ASSESSMENT & PLAN NOTE
Moderate reduction in FEV1 with restriction in Jan 2024. 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  PFTs q6 months given degree of symptoms.  HRCT in late 2024 or early 2025 to assess for progression (or if worsening PFTs)

## 2024-04-03 NOTE — ASSESSMENT & PLAN NOTE
Discussed with patient that I feel her dyspnea is driven by the interaction of multiple mild diseases leading to severe symptoms but that treatments for individual diseases (like her very mild ILD) would be unlikely to provide any relief. Discussed pulmonary rehab and she is not currently able to do this with her schedule. Discussed yoga breathing exercise and regimented exercise program.

## 2024-04-03 NOTE — ASSESSMENT & PLAN NOTE
Rheum fu scheduled. Consider verapamil. I suspect she has a mild CTD process given the mild ILD and FH.

## 2024-04-03 NOTE — ASSESSMENT & PLAN NOTE
VQ negative for CTEPH. I suspect her old clotting disorder contributes to her dyspnea but no sings of active clotting to indicate anticoagulation. On daily ASA per PCP

## 2024-04-03 NOTE — PROGRESS NOTES
Subjective:       Patient ID: Nat Torres is a 63 y.o. female.  The patient's last visit with me was on Visit date not found.     Last seen at  Late 2023.    She is continuing to have dyspnea with very mild activity,  occasionally just with talking. She has had HRCT, PFTs, Echo. RHC and CPEX since I last saw her. All data reviewed.    Only change in her symptoms is a dramatic worsening of Raynauds now signficantly impacting her QOL. She has rheum fu scheduled later this month.    Her PCP is concerned about Prnzmetal Angina and mentioned verapamil.   Review of Systems    Objective:      Vitals:    04/03/24 0847   BP: 103/70   Pulse: 87     Wt Readings from Last 3 Encounters:   04/03/24 77.7 kg (171 lb 4.8 oz)   03/13/24 76 kg (167 lb 8.8 oz)   03/06/24 74.8 kg (165 lb)     Temp Readings from Last 3 Encounters:   02/21/24 98.1 °F (36.7 °C) (Temporal)     BP Readings from Last 3 Encounters:   04/03/24 103/70   03/13/24 119/68   03/06/24 112/77     Pulse Readings from Last 3 Encounters:   04/03/24 87   03/13/24 80   03/06/24 74       Physical Exam   Pulmonary/Chest: Normal expansion and breath sounds normal. She has no wheezes. She has no rales.     CBC  Lab Results   Component Value Date    WBC 5.44 02/21/2024    HGB 14.1 02/21/2024    HCT 44.2 02/21/2024    MCV 89 02/21/2024     02/21/2024         CMP  Sodium   Date Value Ref Range Status   02/21/2024 143 136 - 145 mmol/L Final     Potassium   Date Value Ref Range Status   02/21/2024 3.7 3.5 - 5.1 mmol/L Final     Chloride   Date Value Ref Range Status   02/21/2024 106 95 - 110 mmol/L Final     CO2   Date Value Ref Range Status   02/21/2024 29 23 - 29 mmol/L Final     Glucose   Date Value Ref Range Status   02/21/2024 77 70 - 110 mg/dL Final     BUN   Date Value Ref Range Status   02/21/2024 6 (L) 8 - 23 mg/dL Final     Creatinine   Date Value Ref Range Status   02/21/2024 0.9 0.5 - 1.4 mg/dL Final     Calcium   Date Value Ref Range Status   02/21/2024  "8.8 8.7 - 10.5 mg/dL Final     Total Protein   Date Value Ref Range Status   01/11/2024 7.2 6.0 - 8.4 g/dL Final     Albumin   Date Value Ref Range Status   01/11/2024 3.5 3.5 - 5.2 g/dL Final     Total Bilirubin   Date Value Ref Range Status   01/11/2024 0.9 0.1 - 1.0 mg/dL Final     Comment:     For infants and newborns, interpretation of results should be based  on gestational age, weight and in agreement with clinical  observations.    Premature Infant recommended reference ranges:  Up to 24 hours.............<8.0 mg/dL  Up to 48 hours............<12.0 mg/dL  3-5 days..................<15.0 mg/dL  6-29 days.................<15.0 mg/dL       Alkaline Phosphatase   Date Value Ref Range Status   01/11/2024 83 55 - 135 U/L Final     AST   Date Value Ref Range Status   01/11/2024 18 10 - 40 U/L Final     ALT   Date Value Ref Range Status   01/11/2024 10 10 - 44 U/L Final     Anion Gap   Date Value Ref Range Status   02/21/2024 8 8 - 16 mmol/L Final     eGFR   Date Value Ref Range Status   02/21/2024 >60.0 >60 mL/min/1.73 m^2 Final       ABG  No results found for: "PH", "PO2", "PCO2"        Personal Diagnostic Review  I have personally reviewed the following data and added my own interpretation as below:  RHC reviewed and snows normal filling pressure, no PH, appropriate response with exercise.  CPEX shows deconditioning and reduced respiratory reserve.  PFTs Jan 2024- moderate restriction  HRCT Dec 2023- very mild basilar fibrosis        4/3/2024     8:47 AM 3/13/2024    10:15 AM 3/6/2024    11:10 AM 2/21/2024     3:00 PM 2/21/2024    12:04 PM 2/21/2024     8:45 AM 1/11/2024     3:16 PM   Pulmonary Function Tests   SpO2 95 % 100 %  95 % 95 %     Ordering Provider       Eiswirth, MD   Performing nurse/tech/RT       [facility] CRT   Diagnosis       Qualify for Oxygen;Pulmonary Hypertension   Height 5' 3" (1.6 m) 5' 3" (1.6 m) 5' 3" (1.6 m)   5' 3" (1.6 m) 5' 3" (1.6 m)   Weight 77.7 kg (171 lb 4.8 oz) 76 kg (167 lb 8.8 oz) " 74.8 kg (165 lb)   68.9 kg (152 lb) 68.9 kg (152 lb)   BMI (Calculated) 30.4 29.7 29.2   26.9 26.9   Patient Race          6MWT Status       completed with stops   Patient Reported       Chest pain;Dyspnea   Was O2 used?       No   6MW Distance walked (feet)       1100 feet   Distance walked (meters)       335.28 meters   Did patient stop?       Yes   How many times?       1   Stop Time 1       165 seconds   Restart Time 1       183 seconds   Did patient restart?       Yes   Type of assistive device(s) used?       no assistive devices   Oxygen Saturation       99 %   Supplemental Oxygen       Room Air   Heart Rate       83 bpm   Blood Pressure       106/66   Hema Dyspnea Rating        moderate   Oxygen Saturation       96 %   Supplemental Oxygen       Room Air   Heart Rate       84 bpm   Blood Pressure       126/74   Hema Dyspnea Rating        somewhat heavy   Recovery Time (seconds)       123 seconds   Oxygen Saturation       97 %   Supplemental Oxygen       Room Air   Heart Rate       87 bpm   Is procedure ready for interpretation?       Yes   Oxygen Qualification?       No         Assessment:       1. Raynaud's disease without gangrene    2. History of deep venous thrombosis    3. ILD (interstitial lung disease)    4. Chronic pulmonary heart disease        Outpatient Encounter Medications as of 4/3/2024   Medication Sig Dispense Refill    amitriptyline (ELAVIL) 10 MG tablet Take 1 tablet by mouth every evening.      amoxicillin (AMOXIL) 500 MG Tab as needed.      aspirin (ECOTRIN) 81 MG EC tablet Take 81 mg by mouth once daily.      azelastine (ASTELIN) 137 mcg (0.1 %) nasal spray 1 spray by Nasal route once daily.      biotin-keratin (BIOTIN PLUS KERATIN) 10,000-100 mcg-mg Tab Take 1 tablet by mouth Daily.      budesonide (PULMICORT) 0.5 mg/2 mL nebulizer solution Take by nebulization as needed.      buPROPion (WELLBUTRIN XL) 150 MG TB24 tablet Take 1 tablet by mouth once daily.      calcium  phosphate trib/vit D3 (CITRACAL + D3, CALCIUM PHOS, ORAL) Take 1 tablet by mouth Daily.      docusate sodium (COLACE) 100 MG capsule as needed.      famotidine (PEPCID) 40 MG tablet Take 40 mg by mouth every morning.      fluticasone propionate (FLONASE ALLERGY RELIEF) 50 mcg/actuation nasal spray 2 sprays by Each Nostril route Daily.      furosemide (LASIX) 20 MG tablet Take 20 mg by mouth once daily.      levothyroxine (SYNTHROID) 100 MCG tablet Take 1 tablet by mouth once daily.      LORazepam (ATIVAN) 1 MG tablet Take 1 mg by mouth every 8 (eight) hours as needed for Anxiety.      nebivoloL (BYSTOLIC) 5 MG Tab Take 5 mg by mouth once daily.      spironolactone (ALDACTONE) 25 MG tablet Take 25 mg by mouth once daily.       No facility-administered encounter medications on file as of 4/3/2024.     1. Raynaud's disease without gangrene    2. History of deep venous thrombosis    3. ILD (interstitial lung disease)    4. Chronic pulmonary heart disease    Plan:     Problem List Items Addressed This Visit          Pulmonary    ILD (interstitial lung disease)    Current Assessment & Plan     Moderate reduction in FEV1 with restriction in Jan 2024. 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  PFTs q6 months given degree of symptoms.  HRCT in late 2024 or early 2025 to assess for progression (or if worsening PFTs)            Cardiac/Vascular    Chronic pulmonary heart disease    Current Assessment & Plan     Discussed with patient that I feel her dyspnea is driven by the interaction of multiple mild diseases leading to severe symptoms but that treatments for individual diseases (like her very mild ILD) would be unlikely to provide any relief. Discussed pulmonary rehab and she is not currently able to do this with her schedule. Discussed yoga breathing exercise and regimented exercise program.         Raynaud's disease without gangrene - Primary    Current Assessment & Plan     Rheum fu scheduled. Consider  verapamil. I suspect she has a mild CTD process given the mild ILD and FH.            Hematology    History of deep venous thrombosis    Current Assessment & Plan     VQ negative for CTEPH. I suspect her old clotting disorder contributes to her dyspnea but no sings of active clotting to indicate anticoagulation. On daily ASA per PCP              No follow-ups on file.    Future Appointments   Date Time Provider Department Center   4/24/2024 10:30 AM Naila Paulson MD [facility]         Luc Ayala MD

## 2024-04-17 PROBLEM — R06.09 DOE (DYSPNEA ON EXERTION): Status: ACTIVE | Noted: 2024-04-17

## 2024-04-17 PROBLEM — Z82.69: Status: ACTIVE | Noted: 2024-04-17

## 2024-04-24 ENCOUNTER — OFFICE VISIT (OUTPATIENT)
Dept: RHEUMATOLOGY | Facility: CLINIC | Age: 64
End: 2024-04-24
Payer: COMMERCIAL

## 2024-04-24 ENCOUNTER — LAB VISIT (OUTPATIENT)
Dept: LAB | Facility: HOSPITAL | Age: 64
End: 2024-04-24
Attending: STUDENT IN AN ORGANIZED HEALTH CARE EDUCATION/TRAINING PROGRAM
Payer: COMMERCIAL

## 2024-04-24 VITALS
BODY MASS INDEX: 30.82 KG/M2 | DIASTOLIC BLOOD PRESSURE: 70 MMHG | HEART RATE: 66 BPM | WEIGHT: 173.94 LBS | HEIGHT: 63 IN | SYSTOLIC BLOOD PRESSURE: 105 MMHG

## 2024-04-24 DIAGNOSIS — G62.9 NEUROPATHY: ICD-10-CM

## 2024-04-24 DIAGNOSIS — Z82.69 FAMILY HISTORY OF CREST SYNDROME: ICD-10-CM

## 2024-04-24 DIAGNOSIS — E66.09 CLASS 1 OBESITY DUE TO EXCESS CALORIES WITHOUT SERIOUS COMORBIDITY WITH BODY MASS INDEX (BMI) OF 30.0 TO 30.9 IN ADULT: ICD-10-CM

## 2024-04-24 DIAGNOSIS — I73.00 RAYNAUD'S DISEASE WITHOUT GANGRENE: ICD-10-CM

## 2024-04-24 DIAGNOSIS — R76.8 POSITIVE ANA (ANTINUCLEAR ANTIBODY): ICD-10-CM

## 2024-04-24 DIAGNOSIS — J84.9 ILD (INTERSTITIAL LUNG DISEASE): Primary | ICD-10-CM

## 2024-04-24 DIAGNOSIS — R23.1 LIVEDO RETICULARIS: ICD-10-CM

## 2024-04-24 PROBLEM — E66.811 CLASS 1 OBESITY DUE TO EXCESS CALORIES WITHOUT SERIOUS COMORBIDITY WITH BODY MASS INDEX (BMI) OF 30.0 TO 30.9 IN ADULT: Status: ACTIVE | Noted: 2024-04-24

## 2024-04-24 LAB
C3 SERPL-MCNC: 153 MG/DL (ref 50–180)
C4 SERPL-MCNC: 34 MG/DL (ref 11–44)
CREAT UR-MCNC: 17 MG/DL (ref 15–325)
PROT UR-MCNC: <7 MG/DL (ref 0–15)
PROT/CREAT UR: NORMAL MG/G{CREAT} (ref 0–0.2)

## 2024-04-24 PROCEDURE — 3074F SYST BP LT 130 MM HG: CPT | Mod: CPTII,S$GLB,, | Performed by: STUDENT IN AN ORGANIZED HEALTH CARE EDUCATION/TRAINING PROGRAM

## 2024-04-24 PROCEDURE — 3078F DIAST BP <80 MM HG: CPT | Mod: CPTII,S$GLB,, | Performed by: STUDENT IN AN ORGANIZED HEALTH CARE EDUCATION/TRAINING PROGRAM

## 2024-04-24 PROCEDURE — 86160 COMPLEMENT ANTIGEN: CPT | Performed by: STUDENT IN AN ORGANIZED HEALTH CARE EDUCATION/TRAINING PROGRAM

## 2024-04-24 PROCEDURE — 3008F BODY MASS INDEX DOCD: CPT | Mod: CPTII,S$GLB,, | Performed by: STUDENT IN AN ORGANIZED HEALTH CARE EDUCATION/TRAINING PROGRAM

## 2024-04-24 PROCEDURE — 83516 IMMUNOASSAY NONANTIBODY: CPT | Mod: 59 | Performed by: STUDENT IN AN ORGANIZED HEALTH CARE EDUCATION/TRAINING PROGRAM

## 2024-04-24 PROCEDURE — 99205 OFFICE O/P NEW HI 60 MIN: CPT | Mod: S$GLB,,, | Performed by: STUDENT IN AN ORGANIZED HEALTH CARE EDUCATION/TRAINING PROGRAM

## 2024-04-24 PROCEDURE — 1159F MED LIST DOCD IN RCRD: CPT | Mod: CPTII,S$GLB,, | Performed by: STUDENT IN AN ORGANIZED HEALTH CARE EDUCATION/TRAINING PROGRAM

## 2024-04-24 PROCEDURE — 86235 NUCLEAR ANTIGEN ANTIBODY: CPT | Performed by: STUDENT IN AN ORGANIZED HEALTH CARE EDUCATION/TRAINING PROGRAM

## 2024-04-24 PROCEDURE — 82595 ASSAY OF CRYOGLOBULIN: CPT | Performed by: STUDENT IN AN ORGANIZED HEALTH CARE EDUCATION/TRAINING PROGRAM

## 2024-04-24 PROCEDURE — 83516 IMMUNOASSAY NONANTIBODY: CPT | Performed by: STUDENT IN AN ORGANIZED HEALTH CARE EDUCATION/TRAINING PROGRAM

## 2024-04-24 PROCEDURE — 84156 ASSAY OF PROTEIN URINE: CPT | Performed by: STUDENT IN AN ORGANIZED HEALTH CARE EDUCATION/TRAINING PROGRAM

## 2024-04-24 PROCEDURE — 86160 COMPLEMENT ANTIGEN: CPT | Mod: 59 | Performed by: STUDENT IN AN ORGANIZED HEALTH CARE EDUCATION/TRAINING PROGRAM

## 2024-04-24 PROCEDURE — 99999 PR PBB SHADOW E&M-EST. PATIENT-LVL V: CPT | Mod: PBBFAC,,, | Performed by: STUDENT IN AN ORGANIZED HEALTH CARE EDUCATION/TRAINING PROGRAM

## 2024-04-24 PROCEDURE — 82787 IGG 1 2 3 OR 4 EACH: CPT | Performed by: STUDENT IN AN ORGANIZED HEALTH CARE EDUCATION/TRAINING PROGRAM

## 2024-04-24 PROCEDURE — 36415 COLL VENOUS BLD VENIPUNCTURE: CPT | Performed by: STUDENT IN AN ORGANIZED HEALTH CARE EDUCATION/TRAINING PROGRAM

## 2024-04-24 RX ORDER — NIFEDIPINE 30 MG/1
30 TABLET, EXTENDED RELEASE ORAL DAILY
Qty: 30 TABLET | Refills: 11 | Status: SHIPPED | OUTPATIENT
Start: 2024-04-24 | End: 2025-04-24

## 2024-04-24 NOTE — PATIENT INSTRUCTIONS
For Raynaud's disease:   Do not start taking Nifedipine right away   Start week 1 taking Nebivolol every other day and please check your HR and BP   Week 2 stop Nebivolol and continue monitoring HR and BP -- before starting Nifedipine please let me know in mychart how the BP and HR reading have been

## 2024-04-24 NOTE — PROGRESS NOTES
Answers submitted by the patient for this visit:  Rheumatology Questionnaire (Submitted on 2024)  fever: No  eye redness: Yes  mouth sores: No  headaches: Yes  shortness of breath: Yes  chest pain: No  trouble swallowing: No  diarrhea: No  constipation: Yes  unexpected weight change: Yes  genital sore: No  dysuria: No  During the last 3 days, have you had a skin rash?: No  Bruises or bleeds easily: Yes  cough: No         RHEUMATOLOGY OUTPATIENT CLINIC NOTE    2024    Attending Rheumatologist: Naila Paulson  Primary Care Provider: Corey Blas MD   Physician Requesting Consultation: Lj Ayala Jr., MD  [id number] [place] [address]  Chief Complaint/Reason For Consultation:  Raynaud's    And ILD with positive DOMINIC  Subjective:       HPI  Nat Torres is a 63 y.o. White female with pmhx noted below referred for positive DOMINIC and RP in setting of ILD.  when came home from [facility].  Dr. Ríos because of a positive DOMINIC and a rash in arms and hands and monitoring then diagnosed with Fibromyalgia.  Never saw another Rheumatology  before she was going to have shoulder surgery and wanted to male sure no AI disease-- Dr. Abernathy no autoimmune disease and had Rotator cuff repair   3 yrs ago RP started -- lasts 1-2 hours and no inciting triggers and happens in both toes and fingers. No specific pattern.   Hands getting erythematous like a glove pattern down the wrist bilateral.     Aunt  of crest -- not sure if lung problems   Cousin Myasthenia Gravis   Sister with CIDP on IVIG and doing well  Sister Fibromyalgia   2-3 years ago started with RP     2 yrs ago  - 62th birthday she was at a [employer] and she ws SOB -- cardiac work up- nothing like Pulm HTN   Had [facility] doctor he said pulm htn- aldactone   Dr. Ayala -- [facility] - no pulm htn   RP, livedo reticularis -- ? Prinzmetal angina, 2 DVT post op -- has IVC filter and baby aspirin to prevent micro Pes    Blood work form  November - December 2023:  APS negative    Review of Systems   Constitutional:  Positive for unexpected weight change. Negative for fever.   HENT:  Negative for mouth sores and trouble swallowing.    Eyes:  Positive for redness.   Respiratory:  Positive for shortness of breath. Negative for cough.    Cardiovascular:  Negative for chest pain.   Gastrointestinal:  Positive for constipation. Negative for diarrhea.   Genitourinary:  Negative for dysuria and genital sores.   Integumentary:  Negative for rash.   Neurological:  Positive for headaches.   Hematological:  Bruises/bleeds easily.    Dry mouth and eyes   Pressure chest that limits breathing -- iron bra   No chemo therapy or radiation   Was anastrazole but did  not tolerate     Stiffness and pain but no joint swelling       Chronic comorbid conditions affecting medical decision making today:  Past Medical History:   Diagnosis Date    History of breast cancer 02/17/2024    History of deep venous thrombosis 02/17/2024    Hypothyroidism 02/17/2024    Peptic ulcer disease with hemorrhage 2013    Positive DOMINIC (antinuclear antibody) 1:160 speckled pattern 02/17/2024    Presence of IVC filter 02/17/2024    Primary hypertension 02/17/2024    Pulmonary hypertension 02/17/2024    Raynaud's disease without gangrene 02/17/2024     Past Surgical History:   Procedure Laterality Date    APPENDECTOMY      BILATERAL MASTECTOMY Bilateral     Breast cancer on right    BREAST SURGERY      DEN FILTER PLACEMENT      HYSTERECTOMY      LYSIS OF ADHESIONS      Removal of ovarian cyst      RIGHT HEART CATHETERIZATION Right 2/21/2024    Procedure: INSERTION, CATHETER, RIGHT HEART;  Surgeon: Lj Ayala Jr., MD;  Location: [facility] CATH LAB;  Service: Cardiology;  Laterality: Right;    ROTATOR CUFF REPAIR      WRIST SURGERY Right      No family history on file.  Social History     Substance and Sexual Activity   Alcohol Use Yes    Comment: 1-2 oz per day     Social History      Tobacco Use   Smoking Status Never   Smokeless Tobacco Never     Social History     Substance and Sexual Activity   Drug Use Never       Current Outpatient Medications:     amitriptyline (ELAVIL) 10 MG tablet, Take 1 tablet by mouth every evening., Disp: , Rfl:     amoxicillin (AMOXIL) 500 MG Tab, as needed., Disp: , Rfl:     aspirin (ECOTRIN) 81 MG EC tablet, Take 81 mg by mouth once daily., Disp: , Rfl:     azelastine (ASTELIN) 137 mcg (0.1 %) nasal spray, 1 spray by Nasal route once daily., Disp: , Rfl:     biotin-keratin (BIOTIN PLUS KERATIN) 10,000-100 mcg-mg Tab, Take 1 tablet by mouth Daily., Disp: , Rfl:     budesonide (PULMICORT) 0.5 mg/2 mL nebulizer solution, Take by nebulization as needed., Disp: , Rfl:     buPROPion (WELLBUTRIN XL) 150 MG TB24 tablet, Take 1 tablet by mouth once daily., Disp: , Rfl:     calcium phosphate trib/vit D3 (CITRACAL + D3, CALCIUM PHOS, ORAL), Take 1 tablet by mouth Daily., Disp: , Rfl:     docusate sodium (COLACE) 100 MG capsule, as needed., Disp: , Rfl:     famotidine (PEPCID) 40 MG tablet, Take 40 mg by mouth every morning., Disp: , Rfl:     fluticasone propionate (FLONASE ALLERGY RELIEF) 50 mcg/actuation nasal spray, 2 sprays by Each Nostril route Daily., Disp: , Rfl:     furosemide (LASIX) 20 MG tablet, Take 20 mg by mouth once daily., Disp: , Rfl:     levothyroxine (SYNTHROID) 100 MCG tablet, Take 1 tablet by mouth once daily., Disp: , Rfl:     LORazepam (ATIVAN) 1 MG tablet, Take 1 mg by mouth every 8 (eight) hours as needed for Anxiety., Disp: , Rfl:     nebivoloL (BYSTOLIC) 5 MG Tab, Take 5 mg by mouth once daily., Disp: , Rfl:     spironolactone (ALDACTONE) 25 MG tablet, Take 25 mg by mouth once daily., Disp: , Rfl:      Objective:         Vitals:    04/24/24 1013   BP: 105/70   Pulse: 66     Physical Exam   Constitutional: normal appearance.   HENT:   Head: Normocephalic.   Nose: Nose normal.   Mouth/Throat: Mucous membranes are moist.   Eyes: Conjunctivae are  normal.   Cardiovascular: Normal rate and regular rhythm.   Pulmonary/Chest: Effort normal.   Musculoskeletal:         General: Tenderness present. No swelling. Normal range of motion.      Cervical back: Normal range of motion.   Neurological: She is alert.   Psychiatric: Mood normal.                         Reviewed old and all outside pertinent medical records available.    All lab results personally reviewed and interpreted by me.  Lab Results   Component Value Date    WBC 5.44 02/21/2024    HGB 14.1 02/21/2024    HCT 44.2 02/21/2024    MCV 89 02/21/2024    MCH 28.4 02/21/2024    MCHC 31.9 (L) 02/21/2024    RDW 12.5 02/21/2024     02/21/2024    MPV 8.7 (L) 02/21/2024       Lab Results   Component Value Date     02/21/2024    K 3.7 02/21/2024     02/21/2024    CO2 29 02/21/2024    GLU 77 02/21/2024    BUN 6 (L) 02/21/2024    CALCIUM 8.8 02/21/2024    PROT 7.2 01/11/2024    ALBUMIN 3.5 01/11/2024    BILITOT 0.9 01/11/2024    AST 18 01/11/2024    ALKPHOS 83 01/11/2024    ALT 10 01/11/2024         Imaging:  All imaging reviewed and independently interpreted by me.         ASSESSMENT / PLAN:     Nat Torres is a 63 y.o. White female with:      1. Sjogren's disease  2. Raynaud's disease without gangrene  3. Neuropathy   4. Livedo reticularis   5. Positive DOMINIC (antinuclear antibody)  6. ILD (interstitial lung disease)  - Patient with keratoconjunctivitis sicca symptoms, unclear neuropathy etiology, arthralgia, fatigue, severe RP, mild ILD - this seems to be more consistent with SjS - we will need a lip biopsy for diagnossis since lab work is negative but I think at this time we can wait.   - Start Nifedipine once off Nebivolol since her BP is low - instructions printed   - Pilocarpine for dry mouth if lifestyle modifications not enough   - Ambulatory referral/consult to Rheumatology  - Will do more blood work in case there is an overlap with myositis antibodies   - ILD is not severe and does  not warrant treatment at  this time, continue to follow with Dr. Ayala     7. Family history of CREST syndrome  - CREST AB negative but this can also be a MCTD with some scleroderma features   - Ambulatory referral/consult to Rheumatology     8. Other specified counseling  - over 10 minutes spent regarding below topics:  - Immunization counseling done.  - Weight loss counseling done.  - Nutrition and exercise counseling.  - Limitation of alcohol consumption.  - Regular exercise:  Aerobic and resistance.  - Medication counseling provided.    9. Obesity  - would benefit from decreasing at least 10% of body weight.  - recommended goal of losing 1 lb per week.  - consider nutritionist evaluation.  - would consider screening for SANGITA per PMD.    No follow-ups on file.    Method of contact with patient concerns: [facility]    Disclaimer:  This note is prepared using voice recognition software and as such is likely to have errors and has not been proof read. Please contact me for questions.     Time spent: 60 minutes in face to face discussion concerning diagnosis, prognosis, review of lab and test results, benefits of treatment as well as management of disease, counseling of patient and coordination of care between various health care providers.  Greater than half the time spent was used for coordination of care and counseling of patient.    Naila Paulson M.D.  Rheumatology Department   [facility]

## 2024-04-26 LAB — PROTEINASE3 IGG SER-ACNC: <0.2 U

## 2024-04-29 LAB
IGG4 SER-MCNC: 81 MG/DL (ref 4–86)
MYELOPEROXIDASE AB SER-ACNC: <0.2 U/ML

## 2024-05-06 LAB — CRYOGLOB SER QL: NORMAL

## 2024-05-09 ENCOUNTER — PATIENT MESSAGE (OUTPATIENT)
Dept: RHEUMATOLOGY | Facility: CLINIC | Age: 64
End: 2024-05-09
Payer: COMMERCIAL

## 2024-05-10 DIAGNOSIS — I73.00 RAYNAUD'S DISEASE WITHOUT GANGRENE: Primary | ICD-10-CM

## 2024-05-10 DIAGNOSIS — I27.9 CHRONIC PULMONARY HEART DISEASE: ICD-10-CM

## 2024-05-10 RX ORDER — NEBIVOLOL 2.5 MG/1
2.5 TABLET ORAL DAILY
Qty: 30 TABLET | Refills: 6 | Status: SHIPPED | OUTPATIENT
Start: 2024-05-10

## 2024-05-11 LAB
ANTI-PM/SCL AB: <20 UNITS
ANTI-SS-A 52 KD AB, IGG: <20 UNITS
EJ AB SER QL: NEGATIVE
ENA JO1 AB SER IA-ACNC: <20 UNITS
ENA SM+RNP AB SER IA-ACNC: <20 UNITS
FIBRILLARIN (U3 RNP): NEGATIVE
KU AB SER QL: NEGATIVE
MDA-5 (P140): <20 UNITS
MI2 AB SER QL: NEGATIVE
NXP-2 (P140): <20 UNITS
OJ AB SER QL: NEGATIVE
PL12 AB SER QL: NEGATIVE
PL7 AB SER QL: NEGATIVE
SRP AB SERPL QL: NEGATIVE
TIF1 GAMMA (P155/140): <20 UNITS
U2 SNRNP: NEGATIVE

## 2024-06-11 ENCOUNTER — PATIENT MESSAGE (OUTPATIENT)
Dept: PULMONOLOGY | Facility: CLINIC | Age: 64
End: 2024-06-11
Payer: COMMERCIAL

## 2024-06-11 DIAGNOSIS — I27.9 PULMONARY HEART DISEASE: Primary | ICD-10-CM

## 2024-06-11 DIAGNOSIS — R06.02 SOB (SHORTNESS OF BREATH): ICD-10-CM

## 2024-07-10 ENCOUNTER — HOSPITAL ENCOUNTER (OUTPATIENT)
Dept: PULMONOLOGY | Facility: HOSPITAL | Age: 64
Discharge: HOME | End: 2024-07-10
Attending: INTERNAL MEDICINE
Payer: COMMERCIAL

## 2024-07-10 DIAGNOSIS — R06.02 SOB (SHORTNESS OF BREATH): ICD-10-CM

## 2024-07-10 DIAGNOSIS — I27.9 PULMONARY HEART DISEASE: ICD-10-CM

## 2024-07-10 LAB
DLCO SINGLE BREATH LLN: 15.63
DLCO SINGLE BREATH PRE REF: 66.1 %
DLCO SINGLE BREATH REF: 21.36
DLCOC SBVA LLN: 3.04
DLCOC SBVA REF: 4.64
DLCOC SINGLE BREATH LLN: 15.63
DLCOC SINGLE BREATH REF: 21.36
DLCOCSBVAULN: 6.24
DLCOCSINGLEBREATHULN: 27.09
DLCOSINGLEBREATHULN: 27.09
DLCOSINGLEBREATHZSCORE: -2.08
DLCOVA LLN: 3.04
DLCOVA PRE REF: 96.8 %
DLCOVA PRE: 4.49 ML/(MIN*MMHG*L) (ref 3.04–6.24)
DLCOVA REF: 4.64
DLCOVAULN: 6.24
ERV LLN: -16449.24
ERV PRE REF: 48 %
ERV REF: 0.76
ERVULN: ABNORMAL
FEF 25 75 LLN: 1.42
FEF 25 75 PRE REF: 94.6 %
FEF 25 75 REF: 2.81
FET100 CHG: 8.3 %
FEV05 LLN: 0.88
FEV05 REF: 1.73
FEV1 CHG: 0.8 %
FEV1 FVC LLN: 67
FEV1 FVC PRE REF: 108.4 %
FEV1 FVC REF: 79
FEV1 LLN: 1.72
FEV1 PRE REF: 97 %
FEV1 REF: 2.29
FEV1FVCZSCORE: 1.02
FEV1ZSCORE: -0.2
FRCPLETH LLN: 1.77
FRCPLETH PREREF: 67 %
FRCPLETH REF: 2.59
FRCPLETHULN: 3.41
FVC CHG: -0.4 %
FVC LLN: 2.18
FVC PRE REF: 89 %
FVC REF: 2.9
FVCZSCORE: -0.72
IVC PRE: 2.18 L (ref 2.18–3.64)
IVC SINGLE BREATH LLN: 2.18
IVC SINGLE BREATH PRE REF: 75.4 %
IVC SINGLE BREATH REF: 2.9
IVCSINGLEBREATHULN: 3.64
LLN IC: -16448.17
PEF LLN: 4.33
PEF PRE REF: 57.5 %
PEF REF: 5.94
PHYSICIAN COMMENT: ABNORMAL
POST FEF 25 75: 2.67 L/S (ref 1.42–4.21)
POST FET 100: 7.04 SEC
POST FEV1 FVC: 87.15 % (ref 67.13–89.88)
POST FEV1: 2.24 L (ref 1.72–2.83)
POST FEV5: 1.56 L (ref 0.88–2.59)
POST FVC: 2.57 L (ref 2.18–3.64)
POST PEF: 3.27 L/S (ref 4.33–7.54)
PRE DLCO: 14.12 ML/(MIN*MMHG) (ref 15.63–27.09)
PRE ERV: 0.37 L (ref -16449.24–16450.76)
PRE FEF 25 75: 2.66 L/S (ref 1.42–4.21)
PRE FET 100: 6.5 SEC
PRE FEV05 REF: 91.9 %
PRE FEV1 FVC: 86.07 % (ref 67.13–89.88)
PRE FEV1: 2.22 L (ref 1.72–2.83)
PRE FEV5: 1.59 L (ref 0.88–2.59)
PRE FRC PL: 1.73 L (ref 1.77–3.41)
PRE FVC: 2.58 L (ref 2.18–3.64)
PRE IC: 2.21 L (ref -16448.17–16451.83)
PRE PEF: 3.42 L/S (ref 4.33–7.54)
PRE REF IC: 120.7 %
PRE RV: 1.37 L (ref 1.25–2.4)
PRE TLC: 3.94 L (ref 3.62–5.59)
RAW PRE REF: 55.3 %
RAW PRE: 1.69 CMH2O*S/L (ref 3.06–3.06)
RAW REF: 3.06
REF IC: 1.83
RV LLN: 1.25
RV PRE REF: 74.9 %
RV REF: 1.83
RVTLC LLN: 30
RVTLC PRE REF: 87.3 %
RVTLC PRE: 34.67 % (ref 30.11–49.29)
RVTLC REF: 40
RVTLCULN: 49
RVULN: 2.4
SGAW PRE REF: 279.2 %
SGAW PRE: 0.28 1/(CMH2O*S) (ref 0.1–0.1)
SGAW REF: 0.1
TLC LLN: 3.62
TLC PRE REF: 85.7 %
TLC REF: 4.6
TLC ULN: 5.59
TLCZSCORE: -1.1
ULN IC: ABNORMAL
VA PRE: 3.15 L (ref 4.45–4.45)
VA SINGLE BREATH LLN: 4.45
VA SINGLE BREATH PRE REF: 70.6 %
VA SINGLE BREATH REF: 4.45
VASINGLEBREATHULN: 4.45
VC LLN: 2.18
VC PRE REF: 89 %
VC PRE: 2.58 L (ref 2.18–3.64)
VC REF: 2.9
VC ULN: 3.64

## 2024-07-10 PROCEDURE — 94060 EVALUATION OF WHEEZING: CPT | Mod: 26,S$GLB,, | Performed by: INTERNAL MEDICINE

## 2024-07-10 PROCEDURE — 94729 DIFFUSING CAPACITY: CPT | Mod: 26,S$GLB,, | Performed by: INTERNAL MEDICINE

## 2024-07-10 PROCEDURE — 94726 PLETHYSMOGRAPHY LUNG VOLUMES: CPT | Mod: 26,S$GLB,, | Performed by: INTERNAL MEDICINE

## 2024-08-07 ENCOUNTER — PROCEDURE VISIT (OUTPATIENT)
Dept: NEUROLOGY | Facility: CLINIC | Age: 64
End: 2024-08-07
Payer: COMMERCIAL

## 2024-08-07 ENCOUNTER — PATIENT MESSAGE (OUTPATIENT)
Dept: RHEUMATOLOGY | Facility: CLINIC | Age: 64
End: 2024-08-07
Payer: COMMERCIAL

## 2024-08-07 DIAGNOSIS — R76.8 POSITIVE ANA (ANTINUCLEAR ANTIBODY): ICD-10-CM

## 2024-08-07 DIAGNOSIS — R20.2 NUMBNESS AND TINGLING IN LEFT HAND: ICD-10-CM

## 2024-08-07 DIAGNOSIS — M79.604 PAIN IN BOTH LOWER EXTREMITIES: Primary | ICD-10-CM

## 2024-08-07 DIAGNOSIS — M79.605 PAIN IN BOTH LOWER EXTREMITIES: Primary | ICD-10-CM

## 2024-08-07 DIAGNOSIS — I73.00 RAYNAUD'S DISEASE WITHOUT GANGRENE: ICD-10-CM

## 2024-08-07 DIAGNOSIS — R20.0 NUMBNESS AND TINGLING IN LEFT HAND: ICD-10-CM

## 2024-08-07 PROCEDURE — 99203 OFFICE O/P NEW LOW 30 MIN: CPT | Mod: S$GLB,,, | Performed by: PSYCHIATRY & NEUROLOGY

## 2024-08-07 PROCEDURE — 95912 NRV CNDJ TEST 11-12 STUDIES: CPT | Mod: S$GLB,,, | Performed by: PSYCHIATRY & NEUROLOGY

## 2024-08-07 PROCEDURE — 95886 MUSC TEST DONE W/N TEST COMP: CPT | Mod: S$GLB,,, | Performed by: PSYCHIATRY & NEUROLOGY

## 2024-08-13 ENCOUNTER — OFFICE VISIT (OUTPATIENT)
Dept: PULMONOLOGY | Facility: CLINIC | Age: 64
End: 2024-08-13
Payer: COMMERCIAL

## 2024-08-13 VITALS
BODY MASS INDEX: 30.94 KG/M2 | SYSTOLIC BLOOD PRESSURE: 99 MMHG | HEIGHT: 63 IN | DIASTOLIC BLOOD PRESSURE: 66 MMHG | OXYGEN SATURATION: 97 % | HEART RATE: 80 BPM | WEIGHT: 174.63 LBS

## 2024-08-13 DIAGNOSIS — Z86.718 HISTORY OF DEEP VENOUS THROMBOSIS: ICD-10-CM

## 2024-08-13 DIAGNOSIS — J84.9 ILD (INTERSTITIAL LUNG DISEASE): Primary | ICD-10-CM

## 2024-08-13 DIAGNOSIS — R76.8 POSITIVE ANA (ANTINUCLEAR ANTIBODY): ICD-10-CM

## 2024-08-13 PROCEDURE — 99214 OFFICE O/P EST MOD 30 MIN: CPT | Mod: S$GLB,,, | Performed by: INTERNAL MEDICINE

## 2024-08-13 PROCEDURE — 3074F SYST BP LT 130 MM HG: CPT | Mod: CPTII,S$GLB,, | Performed by: INTERNAL MEDICINE

## 2024-08-13 PROCEDURE — G2211 COMPLEX E/M VISIT ADD ON: HCPCS | Mod: S$GLB,,, | Performed by: INTERNAL MEDICINE

## 2024-08-13 PROCEDURE — 99999 PR PBB SHADOW E&M-EST. PATIENT-LVL IV: CPT | Mod: PBBFAC,,, | Performed by: INTERNAL MEDICINE

## 2024-08-13 PROCEDURE — 3008F BODY MASS INDEX DOCD: CPT | Mod: CPTII,S$GLB,, | Performed by: INTERNAL MEDICINE

## 2024-08-13 PROCEDURE — 1159F MED LIST DOCD IN RCRD: CPT | Mod: CPTII,S$GLB,, | Performed by: INTERNAL MEDICINE

## 2024-08-13 PROCEDURE — 3078F DIAST BP <80 MM HG: CPT | Mod: CPTII,S$GLB,, | Performed by: INTERNAL MEDICINE

## 2024-08-13 RX ORDER — MYCOPHENOLATE MOFETIL 250 MG/1
CAPSULE ORAL
Qty: 120 CAPSULE | Refills: 11 | Status: CANCELLED | OUTPATIENT
Start: 2024-08-13 | End: 2025-08-12

## 2024-08-13 NOTE — ASSESSMENT & PLAN NOTE
Significant chronic condition to be followed longitudinally with following long term treatment plan.     Moderate reduction in FEV1 with restriction in Jan 2024. 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    -PFTs improved July 2024 (first testing at [facility])  PFTs q6 months given degree of symptoms and active CTD symptoms- repeat with HRCT for comparison  HRCT in late 2024

## 2024-08-13 NOTE — PROGRESS NOTES
Subjective:       Patient ID: Nat Torres is a 63 y.o. female.  The patient's last visit with me was on 4/3/2024.     Stable SOB symptoms. Having more increased HR and palpitation symptoms. Improved Raynouds on nifedipine. Having increased flushing sensation in her forearms and hands.    Discussed her PFTs. Discussed continuing close monitoring for progression of lung disease given her active symptoms and her likely CTD.    Follow-up  Review of Systems    Objective:      Vitals:    08/13/24 0846   BP: 99/66   Pulse: 80     Wt Readings from Last 3 Encounters:   08/13/24 79.2 kg (174 lb 9.7 oz)   04/24/24 78.9 kg (173 lb 15.1 oz)   04/03/24 77.7 kg (171 lb 4.8 oz)     Temp Readings from Last 3 Encounters:   02/21/24 98.1 °F (36.7 °C) (Temporal)     BP Readings from Last 3 Encounters:   08/13/24 99/66   04/24/24 105/70   04/03/24 103/70     Pulse Readings from Last 3 Encounters:   08/13/24 80   04/24/24 66   04/03/24 87       Physical Exam    CBC  Lab Results   Component Value Date    WBC 5.44 02/21/2024    HGB 14.1 02/21/2024    HCT 44.2 02/21/2024    MCV 89 02/21/2024     02/21/2024         CMP  Sodium   Date Value Ref Range Status   02/21/2024 143 136 - 145 mmol/L Final     Potassium   Date Value Ref Range Status   02/21/2024 3.7 3.5 - 5.1 mmol/L Final     Chloride   Date Value Ref Range Status   02/21/2024 106 95 - 110 mmol/L Final     CO2   Date Value Ref Range Status   02/21/2024 29 23 - 29 mmol/L Final     Glucose   Date Value Ref Range Status   02/21/2024 77 70 - 110 mg/dL Final     BUN   Date Value Ref Range Status   02/21/2024 6 (L) 8 - 23 mg/dL Final     Creatinine   Date Value Ref Range Status   02/21/2024 0.9 0.5 - 1.4 mg/dL Final     Calcium   Date Value Ref Range Status   02/21/2024 8.8 8.7 - 10.5 mg/dL Final     Total Protein   Date Value Ref Range Status   01/11/2024 7.2 6.0 - 8.4 g/dL Final     Albumin   Date Value Ref Range Status   01/11/2024 3.5 3.5 - 5.2 g/dL Final     Total  "Bilirubin   Date Value Ref Range Status   01/11/2024 0.9 0.1 - 1.0 mg/dL Final     Comment:     For infants and newborns, interpretation of results should be based  on gestational age, weight and in agreement with clinical  observations.    Premature Infant recommended reference ranges:  Up to 24 hours.............<8.0 mg/dL  Up to 48 hours............<12.0 mg/dL  3-5 days..................<15.0 mg/dL  6-29 days.................<15.0 mg/dL       Alkaline Phosphatase   Date Value Ref Range Status   01/11/2024 83 55 - 135 U/L Final     AST   Date Value Ref Range Status   01/11/2024 18 10 - 40 U/L Final     ALT   Date Value Ref Range Status   01/11/2024 10 10 - 44 U/L Final     Anion Gap   Date Value Ref Range Status   02/21/2024 8 8 - 16 mmol/L Final     eGFR   Date Value Ref Range Status   02/21/2024 >60.0 >60 mL/min/1.73 m^2 Final       ABG  No results found for: "PH", "PO2", "PCO2"        Personal Diagnostic Review  I have personally reviewed the following data and added my own interpretation as below:  PFTs with improved jessika, lung volumes  Rheum note reviewed  Neuro Note reviewed  PCP note reviewed      8/13/2024     8:46 AM 4/24/2024    10:13 AM 4/3/2024     8:47 AM 3/13/2024    10:15 AM 3/6/2024    11:10 AM 2/21/2024     3:00 PM 2/21/2024    12:04 PM   Pulmonary Function Tests   SpO2 97 %  95 % 100 %  95 % 95 %   Height 5' 3" (1.6 m) 5' 3" (1.6 m) 5' 3" (1.6 m) 5' 3" (1.6 m) 5' 3" (1.6 m)     Weight 79.2 kg (174 lb 9.7 oz) 78.9 kg (173 lb 15.1 oz) 77.7 kg (171 lb 4.8 oz) 76 kg (167 lb 8.8 oz) 74.8 kg (165 lb)     BMI (Calculated) 30.9 30.8 30.4 29.7 29.2           Assessment:       1. ILD (interstitial lung disease)    2. Positive DOMINIC (antinuclear antibody) 1:160 speckled pattern    3. History of deep venous thrombosis        Outpatient Encounter Medications as of 8/13/2024   Medication Sig Dispense Refill    amitriptyline (ELAVIL) 10 MG tablet Take 1 tablet by mouth every evening.      amoxicillin (AMOXIL) " 500 MG Tab as needed.      aspirin (ECOTRIN) 81 MG EC tablet Take 81 mg by mouth once daily.      azelastine (ASTELIN) 137 mcg (0.1 %) nasal spray 1 spray by Nasal route once daily.      biotin-keratin (BIOTIN PLUS KERATIN) 10,000-100 mcg-mg Tab Take 1 tablet by mouth Daily.      budesonide (PULMICORT) 0.5 mg/2 mL nebulizer solution Take by nebulization as needed.      buPROPion (WELLBUTRIN XL) 150 MG TB24 tablet Take 1 tablet by mouth once daily.      calcium phosphate trib/vit D3 (CITRACAL + D3, CALCIUM PHOS, ORAL) Take 1 tablet by mouth Daily.      docusate sodium (COLACE) 100 MG capsule as needed.      famotidine (PEPCID) 40 MG tablet Take 40 mg by mouth every morning.      fluticasone propionate (FLONASE ALLERGY RELIEF) 50 mcg/actuation nasal spray 2 sprays by Each Nostril route Daily.      furosemide (LASIX) 20 MG tablet Take 20 mg by mouth once daily.      levothyroxine (SYNTHROID) 100 MCG tablet Take 1 tablet by mouth once daily.      LORazepam (ATIVAN) 1 MG tablet Take 1 mg by mouth every 8 (eight) hours as needed for Anxiety.      nebivoloL (BYSTOLIC) 2.5 MG Tab Take 1 tablet (2.5 mg total) by mouth once daily. 30 tablet 6    NIFEdipine (PROCARDIA-XL) 30 MG (OSM) 24 hr tablet Take 1 tablet (30 mg total) by mouth once daily. 30 tablet 11    spironolactone (ALDACTONE) 25 MG tablet Take 25 mg by mouth once daily.       No facility-administered encounter medications on file as of 8/13/2024.     1. ILD (interstitial lung disease)  - CT Chest High Resolution Without Contrast; Future  - Complete PFT with bronchodilator; Future    2. Positive DOMINIC (antinuclear antibody) 1:160 speckled pattern    3. History of deep venous thrombosis    Plan:     Problem List Items Addressed This Visit          Pulmonary    ILD (interstitial lung disease) - Primary    Current Assessment & Plan     Significant chronic condition to be followed longitudinally with following long term treatment plan.     Moderate reduction  in FEV1 with restriction in Jan 2024. 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    -PFTs improved July 2024 (first testing at [facility])  PFTs q6 months given degree of symptoms and active CTD symptoms- repeat with HRCT for comparison  HRCT in late 2024          Relevant Orders    CT Chest High Resolution Without Contrast    Complete PFT with bronchodilator       Immunology/Multi System    Positive DOMINIC (antinuclear antibody) 1:160 speckled pattern    Current Assessment & Plan     Rheum note reviewed. Reached out to determine follow-up plan.   MCTD vs Sj most likely            Hematology    History of deep venous thrombosis    Current Assessment & Plan     VQ negative for CTEPH. I suspect her old clotting disorder contributes to her dyspnea but no sings of active clotting to indicate anticoagulation. On daily ASA per PCP            Please note Overview Notes are historic documentation. Please review A/P for current updates.  No follow-ups on file.    No future appointments.      Luc Ayala MD

## 2024-08-22 ENCOUNTER — OFFICE VISIT (OUTPATIENT)
Dept: RHEUMATOLOGY | Facility: CLINIC | Age: 64
End: 2024-08-22
Payer: COMMERCIAL

## 2024-08-22 ENCOUNTER — LAB VISIT (OUTPATIENT)
Dept: LAB | Facility: HOSPITAL | Age: 64
End: 2024-08-22
Attending: STUDENT IN AN ORGANIZED HEALTH CARE EDUCATION/TRAINING PROGRAM
Payer: COMMERCIAL

## 2024-08-22 VITALS
HEIGHT: 63 IN | HEART RATE: 82 BPM | DIASTOLIC BLOOD PRESSURE: 75 MMHG | WEIGHT: 177 LBS | TEMPERATURE: 98 F | SYSTOLIC BLOOD PRESSURE: 110 MMHG | OXYGEN SATURATION: 97 % | BODY MASS INDEX: 31.36 KG/M2 | RESPIRATION RATE: 18 BRPM

## 2024-08-22 DIAGNOSIS — R76.8 POSITIVE ANA (ANTINUCLEAR ANTIBODY): ICD-10-CM

## 2024-08-22 DIAGNOSIS — G62.9 NEUROPATHY: ICD-10-CM

## 2024-08-22 DIAGNOSIS — M35.02 SJOGREN'S SYNDROME WITH LUNG INVOLVEMENT: ICD-10-CM

## 2024-08-22 DIAGNOSIS — J84.9 ILD (INTERSTITIAL LUNG DISEASE): ICD-10-CM

## 2024-08-22 DIAGNOSIS — J84.9 ILD (INTERSTITIAL LUNG DISEASE): Primary | ICD-10-CM

## 2024-08-22 DIAGNOSIS — I73.00 RAYNAUD'S DISEASE WITHOUT GANGRENE: ICD-10-CM

## 2024-08-22 DIAGNOSIS — R23.1 LIVEDO RETICULARIS: ICD-10-CM

## 2024-08-22 DIAGNOSIS — Z82.69 FAMILY HISTORY OF CREST SYNDROME: ICD-10-CM

## 2024-08-22 LAB
CRP SERPL-MCNC: 7.5 MG/L (ref 0–8.2)
ERYTHROCYTE [SEDIMENTATION RATE] IN BLOOD BY PHOTOMETRIC METHOD: 4 MM/HR (ref 0–36)
HBV CORE AB SERPL QL IA: NORMAL
HBV SURFACE AB SER-ACNC: 3.79 MIU/ML
HBV SURFACE AB SER-ACNC: NORMAL M[IU]/ML
HBV SURFACE AG SERPL QL IA: NORMAL
HCV AB SERPL QL IA: NORMAL
HIV 1+2 AB+HIV1 P24 AG SERPL QL IA: NORMAL

## 2024-08-22 PROCEDURE — 3078F DIAST BP <80 MM HG: CPT | Mod: CPTII,S$GLB,, | Performed by: STUDENT IN AN ORGANIZED HEALTH CARE EDUCATION/TRAINING PROGRAM

## 2024-08-22 PROCEDURE — 85652 RBC SED RATE AUTOMATED: CPT | Performed by: STUDENT IN AN ORGANIZED HEALTH CARE EDUCATION/TRAINING PROGRAM

## 2024-08-22 PROCEDURE — 3008F BODY MASS INDEX DOCD: CPT | Mod: CPTII,S$GLB,, | Performed by: STUDENT IN AN ORGANIZED HEALTH CARE EDUCATION/TRAINING PROGRAM

## 2024-08-22 PROCEDURE — 82164 ANGIOTENSIN I ENZYME TEST: CPT | Performed by: STUDENT IN AN ORGANIZED HEALTH CARE EDUCATION/TRAINING PROGRAM

## 2024-08-22 PROCEDURE — 86140 C-REACTIVE PROTEIN: CPT | Performed by: STUDENT IN AN ORGANIZED HEALTH CARE EDUCATION/TRAINING PROGRAM

## 2024-08-22 PROCEDURE — 86706 HEP B SURFACE ANTIBODY: CPT | Mod: 91 | Performed by: STUDENT IN AN ORGANIZED HEALTH CARE EDUCATION/TRAINING PROGRAM

## 2024-08-22 PROCEDURE — 86480 TB TEST CELL IMMUN MEASURE: CPT | Performed by: STUDENT IN AN ORGANIZED HEALTH CARE EDUCATION/TRAINING PROGRAM

## 2024-08-22 PROCEDURE — 87340 HEPATITIS B SURFACE AG IA: CPT | Performed by: STUDENT IN AN ORGANIZED HEALTH CARE EDUCATION/TRAINING PROGRAM

## 2024-08-22 PROCEDURE — 1159F MED LIST DOCD IN RCRD: CPT | Mod: CPTII,S$GLB,, | Performed by: STUDENT IN AN ORGANIZED HEALTH CARE EDUCATION/TRAINING PROGRAM

## 2024-08-22 PROCEDURE — 87389 HIV-1 AG W/HIV-1&-2 AB AG IA: CPT | Performed by: STUDENT IN AN ORGANIZED HEALTH CARE EDUCATION/TRAINING PROGRAM

## 2024-08-22 PROCEDURE — 86704 HEP B CORE ANTIBODY TOTAL: CPT | Performed by: STUDENT IN AN ORGANIZED HEALTH CARE EDUCATION/TRAINING PROGRAM

## 2024-08-22 PROCEDURE — 99999 PR PBB SHADOW E&M-EST. PATIENT-LVL V: CPT | Mod: PBBFAC,,, | Performed by: STUDENT IN AN ORGANIZED HEALTH CARE EDUCATION/TRAINING PROGRAM

## 2024-08-22 PROCEDURE — 3074F SYST BP LT 130 MM HG: CPT | Mod: CPTII,S$GLB,, | Performed by: STUDENT IN AN ORGANIZED HEALTH CARE EDUCATION/TRAINING PROGRAM

## 2024-08-22 PROCEDURE — 83520 IMMUNOASSAY QUANT NOS NONAB: CPT | Performed by: STUDENT IN AN ORGANIZED HEALTH CARE EDUCATION/TRAINING PROGRAM

## 2024-08-22 PROCEDURE — 99215 OFFICE O/P EST HI 40 MIN: CPT | Mod: S$GLB,,, | Performed by: STUDENT IN AN ORGANIZED HEALTH CARE EDUCATION/TRAINING PROGRAM

## 2024-08-22 PROCEDURE — 36415 COLL VENOUS BLD VENIPUNCTURE: CPT | Performed by: STUDENT IN AN ORGANIZED HEALTH CARE EDUCATION/TRAINING PROGRAM

## 2024-08-22 PROCEDURE — 86803 HEPATITIS C AB TEST: CPT | Performed by: STUDENT IN AN ORGANIZED HEALTH CARE EDUCATION/TRAINING PROGRAM

## 2024-08-22 RX ORDER — B6/MEFOLATE/MECOBAL/ACETYLCYST 1.7-6-2 MG
1 CAPSULE,DELAYED RELEASE (ENTERIC COATED) ORAL DAILY
COMMUNITY
Start: 2024-07-03

## 2024-08-22 RX ORDER — HYDROXYCHLOROQUINE SULFATE 200 MG/1
400 TABLET, FILM COATED ORAL DAILY
Qty: 60 TABLET | Refills: 11 | Status: SHIPPED | OUTPATIENT
Start: 2024-08-22

## 2024-08-22 NOTE — PROGRESS NOTES
Answers submitted by the patient for this visit:  Rheumatology Questionnaire (Submitted on 2024)  fever: No  eye redness: Yes  mouth sores: No  headaches: Yes  shortness of breath: Yes  chest pain: No  trouble swallowing: No  diarrhea: No  constipation: Yes  unexpected weight change: Yes  genital sore: No  dysuria: No  During the last 3 days, have you had a skin rash?: No  Bruises or bleeds easily: Yes  cough: No         RHEUMATOLOGY OUTPATIENT CLINIC NOTE    2024    Attending Rheumatologist: Naila Paulson  Primary Care Provider: Corey Blas MD   Physician Requesting Consultation: No referring provider defined for this encounter.  Chief Complaint/Reason For Consultation:  Positive DOMINIC and raynaud's disease    And ILD with positive DOMINIC  Subjective:       HPI  Nat Torres is a 63 y.o. White female with pmhx noted below referred for positive DOMINIC and RP in setting of ILD.  when came home from [facility].  Dr. Ríos because of a positive DOMINIC and a rash in arms and hands and monitoring then diagnosed with Fibromyalgia.  Never saw another Rheumatology  before she was going to have shoulder surgery and wanted to male sure no AI disease-- Dr. Abernathy no autoimmune disease and had Rotator cuff repair   3 yrs ago RP started -- lasts 1-2 hours and no inciting triggers and happens in both toes and fingers. No specific pattern.   Hands getting erythematous like a glove pattern down the wrist bilateral.     Aunt  of crest -- not sure if lung problems   Cousin Myasthenia Gravis   Sister with CIDP on IVIG and doing well  Sister Fibromyalgia   2-3 years ago started with RP     2 yrs ago  - 62th birthday she was at a [employer] and she ws SOB -- cardiac work up- nothing like Pulm HTN   Had  doctor he said pulm htn- aldactone   Dr. Ayala -- [facility] - no pulm htn   RP, livedo reticularis -- ? Prinzmetal angina, 2 DVT post op -- has IVC filter and baby aspirin to prevent micro Pes    Blood work form  November - December 2023:  APS negative    Today: Patient here for follow up   Patient reports that she started taking the Nifedipine but since off Nebivolol she reports her smart watch is  reading from 60-120s.   She is still feeling SOB     Review of Systems   Constitutional:  Positive for unexpected weight change. Negative for fever.   HENT:  Negative for mouth sores and trouble swallowing.    Eyes:  Positive for redness.   Respiratory:  Positive for shortness of breath. Negative for cough.    Cardiovascular:  Negative for chest pain.   Gastrointestinal:  Positive for constipation. Negative for diarrhea.   Genitourinary:  Negative for dysuria and genital sores.   Integumentary:  Negative for rash.   Neurological:  Positive for headaches.   Hematological:  Bruises/bleeds easily.    Dry mouth and eyes   Pressure chest that limits breathing -- iron bra   No chemo therapy or radiation   Was anastrazole but did  not tolerate     Stiffness and pain but no joint swelling       Chronic comorbid conditions affecting medical decision making today:  Past Medical History:   Diagnosis Date    History of breast cancer 02/17/2024    History of deep venous thrombosis 02/17/2024    Hypothyroidism 02/17/2024    Peptic ulcer disease with hemorrhage 2013    Positive DOMINIC (antinuclear antibody) 1:160 speckled pattern 02/17/2024    Presence of IVC filter 02/17/2024    Primary hypertension 02/17/2024    Pulmonary hypertension 02/17/2024    Raynaud's disease without gangrene 02/17/2024     Past Surgical History:   Procedure Laterality Date    APPENDECTOMY      BILATERAL MASTECTOMY Bilateral     Breast cancer on right    BREAST SURGERY      DEN FILTER PLACEMENT      HYSTERECTOMY      LYSIS OF ADHESIONS      Removal of ovarian cyst      RIGHT HEART CATHETERIZATION Right 2/21/2024    Procedure: INSERTION, CATHETER, RIGHT HEART;  Surgeon: Lj Ayala Jr., MD;  Location: [facility] CATH LAB;  Service: Cardiology;  Laterality: Right;     ROTATOR CUFF REPAIR      WRIST SURGERY Right      No family history on file.  Social History     Substance and Sexual Activity   Alcohol Use Yes    Comment: 1-2 oz per day     Social History     Tobacco Use   Smoking Status Never   Smokeless Tobacco Never     Social History     Substance and Sexual Activity   Drug Use Never       Current Outpatient Medications:     amitriptyline (ELAVIL) 10 MG tablet, Take 1 tablet by mouth every evening., Disp: , Rfl:     amoxicillin (AMOXIL) 500 MG Tab, as needed., Disp: , Rfl:     aspirin (ECOTRIN) 81 MG EC tablet, Take 81 mg by mouth once daily., Disp: , Rfl:     azelastine (ASTELIN) 137 mcg (0.1 %) nasal spray, 1 spray by Nasal route once daily., Disp: , Rfl:     W0-xbwmoej-hayitovmv-acetylcys (EB-C3 DR) 1.7-6-2-600 mg CpDR, Take 1 capsule by mouth once daily., Disp: , Rfl:     biotin-keratin (BIOTIN PLUS KERATIN) 10,000-100 mcg-mg Tab, Take 1 tablet by mouth Daily., Disp: , Rfl:     budesonide (PULMICORT) 0.5 mg/2 mL nebulizer solution, Take by nebulization as needed., Disp: , Rfl:     buPROPion (WELLBUTRIN XL) 150 MG TB24 tablet, Take 1 tablet by mouth once daily., Disp: , Rfl:     calcium phosphate trib/vit D3 (CITRACAL + D3, CALCIUM PHOS, ORAL), Take 1 tablet by mouth Daily., Disp: , Rfl:     docusate sodium (COLACE) 100 MG capsule, as needed., Disp: , Rfl:     famotidine (PEPCID) 40 MG tablet, Take 40 mg by mouth every morning., Disp: , Rfl:     fluticasone propionate (FLONASE ALLERGY RELIEF) 50 mcg/actuation nasal spray, 2 sprays by Each Nostril route Daily., Disp: , Rfl:     furosemide (LASIX) 20 MG tablet, Take 20 mg by mouth once daily., Disp: , Rfl:     levothyroxine (SYNTHROID) 100 MCG tablet, Take 1 tablet by mouth once daily., Disp: , Rfl:     LORazepam (ATIVAN) 1 MG tablet, Take 1 mg by mouth every 8 (eight) hours as needed for Anxiety., Disp: , Rfl:     nebivoloL (BYSTOLIC) 2.5 MG Tab, Take 1 tablet (2.5 mg total) by mouth once daily., Disp: 30 tablet, Rfl:  6    NIFEdipine (PROCARDIA-XL) 30 MG (OSM) 24 hr tablet, Take 1 tablet (30 mg total) by mouth once daily., Disp: 30 tablet, Rfl: 11    spironolactone (ALDACTONE) 25 MG tablet, Take 25 mg by mouth once daily., Disp: , Rfl:      Objective:         Vitals:    08/22/24 1223   BP: 110/75   Pulse: 82   Resp: 18   Temp: 97.6 °F (36.4 °C)     Physical Exam   Constitutional: normal appearance.   HENT:   Head: Normocephalic.   Nose: Nose normal.   Mouth/Throat: Mucous membranes are moist.   Eyes: Conjunctivae are normal.   Cardiovascular: Normal rate and regular rhythm.   Pulmonary/Chest: Effort normal.   Musculoskeletal:         General: Tenderness present. No swelling. Normal range of motion.      Cervical back: Normal range of motion.   Neurological: She is alert.   Psychiatric: Mood normal.                         Reviewed old and all outside pertinent medical records available.    All lab results personally reviewed and interpreted by me.  Lab Results   Component Value Date    WBC 5.44 02/21/2024    HGB 14.1 02/21/2024    HCT 44.2 02/21/2024    MCV 89 02/21/2024    MCH 28.4 02/21/2024    MCHC 31.9 (L) 02/21/2024    RDW 12.5 02/21/2024     02/21/2024    MPV 8.7 (L) 02/21/2024       Lab Results   Component Value Date     02/21/2024    K 3.7 02/21/2024     02/21/2024    CO2 29 02/21/2024    GLU 77 02/21/2024    BUN 6 (L) 02/21/2024    CALCIUM 8.8 02/21/2024    PROT 7.2 01/11/2024    ALBUMIN 3.5 01/11/2024    BILITOT 0.9 01/11/2024    AST 18 01/11/2024    ALKPHOS 83 01/11/2024    ALT 10 01/11/2024         Imaging:  All imaging reviewed and independently interpreted by me.         ASSESSMENT / PLAN:     Nat Torres is a 63 y.o. White female with:      1. Sjogren's disease  2. Raynaud's disease without gangrene  3. Neuropathy   4. Livedo reticularis   5. Positive DOMINIC (antinuclear antibody)  6. ILD (interstitial lung disease)  - Patient with keratoconjunctivitis sicca symptoms, unclear neuropathy  etiology, arthralgia, fatigue, severe RP, mild ILD - this seems to be more consistent with SjS - we will need a lip biopsy for diagnossis since lab work is negative but I think at this time we can wait.   - Continue Nifedipine   - need to contact cardiology for HR control   - Pilocarpine for dry mouth if lifestyle modifications not enough   - ILD - had recent appt with Dr. Ayala - he suggested MMF(not in his note but per patient recollection)   - I agree with starting medication to prevent worsening lung dysfunction but also to manage neuropathy if 2.2 to autoimmune disease    7. Family history of CREST syndrome  - CREST AB negative but this can also be a MCTD with some scleroderma features   - Ambulatory referral/consult to Rheumatology     8. Other specified counseling  - over 10 minutes spent regarding below topics:  - Immunization counseling done.  - Weight loss counseling done.  - Nutrition and exercise counseling.  - Limitation of alcohol consumption.  - Regular exercise:  Aerobic and resistance.  - Medication counseling provided.    9. Obesity  - would benefit from decreasing at least 10% of body weight.  - recommended goal of losing 1 lb per week.  - consider nutritionist evaluation.  - would consider screening for SANGITA per PMD.    No follow-ups on file.    Method of contact with patient concerns: [facility]    Disclaimer:  This note is prepared using voice recognition software and as such is likely to have errors and has not been proof read. Please contact me for questions.     Time spent: 40 minutes in face to face discussion concerning diagnosis, prognosis, review of lab and test results, benefits of treatment as well as management of disease, counseling of patient and coordination of care between various health care providers.  Greater than half the time spent was used for coordination of care and counseling of patient.    Naila Paulson M.D.  Rheumatology Department   [facility]

## 2024-08-23 LAB
GAMMA INTERFERON BACKGROUND BLD IA-ACNC: 0 IU/ML
M TB IFN-G CD4+ BCKGRND COR BLD-ACNC: 0.01 IU/ML
M TB IFN-G CD4+ BCKGRND COR BLD-ACNC: 0.03 IU/ML
MITOGEN IGNF BCKGRD COR BLD-ACNC: 4.56 IU/ML
TB GOLD PLUS: NEGATIVE

## 2024-08-24 LAB — ACE SERPL-CCNC: 31 U/L (ref 16–85)

## 2024-08-25 LAB — SOL IL2 RECEP SERPL-MCNC: 321.5 PG/ML (ref 175.3–858.2)

## 2024-10-02 ENCOUNTER — OFFICE VISIT (OUTPATIENT)
Dept: OTOLARYNGOLOGY | Facility: CLINIC | Age: 64
End: 2024-10-02
Payer: COMMERCIAL

## 2024-10-02 VITALS
DIASTOLIC BLOOD PRESSURE: 75 MMHG | WEIGHT: 176.38 LBS | HEART RATE: 75 BPM | HEIGHT: 63 IN | SYSTOLIC BLOOD PRESSURE: 114 MMHG | BODY MASS INDEX: 31.25 KG/M2

## 2024-10-02 DIAGNOSIS — J84.9 ILD (INTERSTITIAL LUNG DISEASE): ICD-10-CM

## 2024-10-02 DIAGNOSIS — K11.7 XEROSTOMIA: Primary | Chronic | ICD-10-CM

## 2024-10-02 DIAGNOSIS — M35.00 SICCA, UNSPECIFIED TYPE: Chronic | ICD-10-CM

## 2024-10-02 DIAGNOSIS — M35.02 SJOGREN'S SYNDROME WITH LUNG INVOLVEMENT: Chronic | ICD-10-CM

## 2024-10-02 PROCEDURE — 99999 PR PBB SHADOW E&M-EST. PATIENT-LVL V: CPT | Mod: PBBFAC,,, | Performed by: OTOLARYNGOLOGY

## 2024-10-02 PROCEDURE — 88305 TISSUE EXAM BY PATHOLOGIST: CPT | Performed by: STUDENT IN AN ORGANIZED HEALTH CARE EDUCATION/TRAINING PROGRAM

## 2024-10-02 NOTE — PROGRESS NOTES
Chief Complaint   Patient presents with    Consult     Pt stated she is here for a lip biopsy   .     HPI:  Nat Torres is a 63 y.o. White female referred by rheumatologist Dr. Lovelace who is requesting minor salivary gland biopsy. Pt endorses dry mouth, dry eyes.Per last note with rheumatology about 6 weeks ago Mrs. Torres has diagnosis of:    1. Sjogren's disease  2. Raynaud's disease without gangrene  3. Neuropathy   4. Livedo reticularis   5. Positive DOMINIC (antinuclear antibody)  6. ILD (interstitial lung disease)  - Patient with keratoconjunctivitis sicca symptoms, unclear neuropathy etiology, arthralgia, fatigue, severe RP, mild ILD - this seems to be more consistent with SjS - we will need a lip biopsy for diagnossis since lab work is negative. The patient also has family history of autoimmune disease- CREST syndrome.       Social History     Socioeconomic History    Marital status:    Tobacco Use    Smoking status: Never    Smokeless tobacco: Never   Substance and Sexual Activity    Alcohol use: Yes     Comment: 1-2 oz per day    Drug use: Never     Social Drivers of Health     Financial Resource Strain: Low Risk  (6/24/2024)    Received from [facility]    Overall Financial Resource Strain (CARDIA)     Difficulty of Paying Living Expenses: Not hard at all   Food Insecurity: No Food Insecurity (6/24/2024)    Received from [facility]    Hunger Vital Sign     Worried About Running Out of Food in the Last Year: Never true     Ran Out of Food in the Last Year: Never true   Transportation Needs: No Transportation Needs (6/24/2024)    Received from [facility]    PRAPARE - Transportation     Lack of Transportation (Medical): No     Lack of Transportation (Non-Medical): No   Physical Activity: Insufficiently Active (6/24/2024)    Received from [facility]    Exercise Vital Sign     Days of Exercise per Week: 4 days     Minutes of Exercise per Session: 20 min   Stress: Stress Concern Present  (6/24/2024)    Received from [facility]    [facility] of Occupational Health - Occupational Stress Questionnaire     Feeling of Stress : Very much   Housing Stability: Low Risk  (1/10/2024)    Housing Stability Vital Sign     Unable to Pay for Housing in the Last Year: No     Number of Places Lived in the Last Year: 1     Unstable Housing in the Last Year: No     Past Surgical History:   Procedure Laterality Date    APPENDECTOMY      BILATERAL MASTECTOMY Bilateral     Breast cancer on right    BREAST SURGERY      DEN FILTER PLACEMENT      HYSTERECTOMY      LYSIS OF ADHESIONS      Removal of ovarian cyst      RIGHT HEART CATHETERIZATION Right 2/21/2024    Procedure: INSERTION, CATHETER, RIGHT HEART;  Surgeon: Lj Ayala Jr., MD;  Location: [facility] CATH LAB;  Service: Cardiology;  Laterality: Right;    ROTATOR CUFF REPAIR      WRIST SURGERY Right      No family history on file.        Review of Systems  General: negative for chills, fever or weight loss  Psychological: negative for mood changes or depression  Ophthalmic: negative for blurry vision, photophobia or eye pain  ENT: see HPI  Respiratory: no cough, shortness of breath, or wheezing  Cardiovascular: no chest pain or dyspnea on exertion  Gastrointestinal: no abdominal pain, change in bowel habits, or black/ bloody stools  Musculoskeletal: negative for gait disturbance or muscular weakness  Neurological: no syncope or seizures; no ataxia  Dermatological: negative for puritis,  rash and jaundice  Hematologic/lymphatic: no easy bruising, no new lumps or bumps      Physical Exam:    Vitals:    10/02/24 1112   BP: 114/75   Pulse: 75       Constitutional: Well appearing / communicating without difficutly.  NAD.  Eyes: EOM I Bilaterally  Head/Face: Normocephalic.  Negative paranasal sinus pressure/tenderness.  Salivary glands WNL.  House Brackmann I Bilaterally.    Right Ear: Auricle normal appearance. External Auditory Canal within normal  limits no lesions or masses,TM w/o masses/lesions/perforations. TM mobility noted.   Left Ear: Auricle normal appearance. External Auditory Canal within normal limits no lesions or masses,TM w/o masses/lesions/perforations. TM mobility noted.  Nose: No gross nasal septal deviation. Inferior Turbinates 3+ bilaterally. No septal perforation. No masses/lesions. External nasal skin appears normal without masses/lesions.  Oral Cavity: Gingiva/lips within normal limits.  Dentition/gingiva healthy appearing. Mucus membranes moist. Floor of mouth soft, no masses palpated. Oral Tongue mobile. Hard Palate appears normal.    Oropharynx: Base of tongue appears normal. No masses/lesions noted. Tonsillar fossa/pharyngeal wall without lesions. Posterior oropharynx WNL.  Soft palate without masses. Midline uvula.   Neck/Lymphatic: No LAD I-VI bilaterally.  No thyromegaly.  No masses noted on exam.        Assessment:    ICD-10-CM ICD-9-CM    1. Xerostomia  K11.7 527.7 Specimen to Pathology ENT      2. Sicca, unspecified type  M35.00 710.2 Specimen to Pathology ENT      3. ILD (interstitial lung disease)  J84.9 515 Ambulatory referral/consult to ENT      4. Sjogren's syndrome with lung involvement  M35.02 710.2 Ambulatory referral/consult to ENT     517.8         The primary encounter diagnosis was Xerostomia. Diagnoses of Sicca, unspecified type, ILD (interstitial lung disease), and Sjogren's syndrome with lung involvement were also pertinent to this visit.      Plan:  No orders of the defined types were placed in this encounter.    Discussed option of minor salivary gland biopsy to help aid in work up of constellation of symptoms that her rheumatologist believes could be due to Sjogren's.  We discussed the risks/benefits of the procedure in detail today. Written informed consent was obtained.  Post procedure care instructions given. May use OTC tylenol/motrin and/or oragel for analgesia. Follow up in 2 weeks.       Meena Veliz  MD Shweta

## 2024-10-02 NOTE — PROCEDURES
Procedures    Preprocedure diagnosis:  Xerostromia,  possible Sjogren's  Postproecdure diagnosis:   Same    Procedure:  Minor salivary gland biopsy    Complications:  None  Specimen:  Minor salivary glands, lower lip  Blood loss:   Minimal  Anesthesia:  Local, lidocaine    Procedure in detail:  After appropriate consents were obtained, the right lower lip was anesthetized using 1.5 cc of 1% lidocaine with epinephrine.  A #15 blade was used to make an incision over the right lower lip, mucosal surface.  A pair of pickups with teeth and fine scissors was then used to dissect several minor salivary glands, all superficial to the musculature.  The incision was then closed in a simple interrupted fashion with a 4-0 chromic suture.  The patient tolerated the procedure without difficulty, and there were no complications.

## 2024-10-03 LAB
FINAL PATHOLOGIC DIAGNOSIS: NORMAL
GROSS: NORMAL
Lab: NORMAL

## 2024-10-16 ENCOUNTER — OFFICE VISIT (OUTPATIENT)
Dept: OTOLARYNGOLOGY | Facility: CLINIC | Age: 64
End: 2024-10-16
Payer: COMMERCIAL

## 2024-10-16 VITALS — DIASTOLIC BLOOD PRESSURE: 75 MMHG | HEART RATE: 75 BPM | SYSTOLIC BLOOD PRESSURE: 109 MMHG

## 2024-10-16 DIAGNOSIS — M35.00 SICCA, UNSPECIFIED TYPE: ICD-10-CM

## 2024-10-16 DIAGNOSIS — K11.7 XEROSTOMIA: Primary | ICD-10-CM

## 2024-10-16 PROCEDURE — 99999 PR PBB SHADOW E&M-EST. PATIENT-LVL IV: CPT | Mod: PBBFAC,,, | Performed by: OTOLARYNGOLOGY

## 2024-10-16 PROCEDURE — 1160F RVW MEDS BY RX/DR IN RCRD: CPT | Mod: CPTII,S$GLB,, | Performed by: OTOLARYNGOLOGY

## 2024-10-16 PROCEDURE — 99212 OFFICE O/P EST SF 10 MIN: CPT | Mod: S$GLB,,, | Performed by: OTOLARYNGOLOGY

## 2024-10-16 PROCEDURE — 1159F MED LIST DOCD IN RCRD: CPT | Mod: CPTII,S$GLB,, | Performed by: OTOLARYNGOLOGY

## 2024-10-16 PROCEDURE — 3074F SYST BP LT 130 MM HG: CPT | Mod: CPTII,S$GLB,, | Performed by: OTOLARYNGOLOGY

## 2024-10-16 PROCEDURE — 3078F DIAST BP <80 MM HG: CPT | Mod: CPTII,S$GLB,, | Performed by: OTOLARYNGOLOGY

## 2024-10-16 NOTE — PROGRESS NOTES
Chief Complaint   Patient presents with    Post-op Evaluation     Doing good no new issues    .     HPI:  Nat Torres is a 63 y.o. White female referred by rheumatologist Dr. Lovelace who is requesting minor salivary gland biopsy. Pt endorses dry mouth, dry eyes.Per last note with rheumatology about 6 weeks ago Mrs. Torres has diagnosis of:    1. Sjogren's disease  2. Raynaud's disease without gangrene  3. Neuropathy   4. Livedo reticularis   5. Positive DOMINIC (antinuclear antibody)  6. ILD (interstitial lung disease)  - Patient with keratoconjunctivitis sicca symptoms, unclear neuropathy etiology, arthralgia, fatigue, severe RP, mild ILD - this seems to be more consistent with SjS - we will need a lip biopsy for diagnossis since lab work is negative. The patient also has family history of autoimmune disease- CREST syndrome.       Interval HPI 10/16/2024:  Follow up visit. Reports lower lip has been doing well. No new complaints.   She has not had additional follow up with rheumatology since results of biopsy.     Social History     Socioeconomic History    Marital status:    Tobacco Use    Smoking status: Never    Smokeless tobacco: Never   Substance and Sexual Activity    Alcohol use: Yes     Comment: 1-2 oz per day    Drug use: Never     Social Drivers of Health     Financial Resource Strain: Low Risk  (6/24/2024)    Received from [facility]    Overall Financial Resource Strain (CARDIA)     Difficulty of Paying Living Expenses: Not hard at all   Food Insecurity: No Food Insecurity (6/24/2024)    Received from [facility]    Hunger Vital Sign     Worried About Running Out of Food in the Last Year: Never true     Ran Out of Food in the Last Year: Never true   Transportation Needs: No Transportation Needs (6/24/2024)    Received from [facility]    PRAPARE - Transportation     Lack of Transportation (Medical): No     Lack of Transportation (Non-Medical): No   Physical Activity: Insufficiently Active  (6/24/2024)    Received from [facility]    Exercise Vital Sign     Days of Exercise per Week: 4 days     Minutes of Exercise per Session: 20 min   Stress: Stress Concern Present (6/24/2024)    Received from [facility]    [place] of Occupational Health - Occupational Stress Questionnaire     Feeling of Stress : Very much   Housing Stability: Low Risk  (1/10/2024)    Housing Stability Vital Sign     Unable to Pay for Housing in the Last Year: No     Number of Places Lived in the Last Year: 1     Unstable Housing in the Last Year: No     Past Surgical History:   Procedure Laterality Date    APPENDECTOMY      BILATERAL MASTECTOMY Bilateral     Breast cancer on right    BREAST SURGERY      DEN FILTER PLACEMENT      HYSTERECTOMY      LYSIS OF ADHESIONS      Removal of ovarian cyst      RIGHT HEART CATHETERIZATION Right 2/21/2024    Procedure: INSERTION, CATHETER, RIGHT HEART;  Surgeon: Lj Ayala Jr., MD;  Location: [facility] CATH LAB;  Service: Cardiology;  Laterality: Right;    ROTATOR CUFF REPAIR      WRIST SURGERY Right      No family history on file.        Review of Systems  General: negative for chills, fever or weight loss  Psychological: negative for mood changes or depression  Ophthalmic: negative for blurry vision, photophobia or eye pain  ENT: see HPI  Respiratory: no cough, shortness of breath, or wheezing  Cardiovascular: no chest pain or dyspnea on exertion  Gastrointestinal: no abdominal pain, change in bowel habits, or black/ bloody stools  Musculoskeletal: negative for gait disturbance or muscular weakness  Neurological: no syncope or seizures; no ataxia  Dermatological: negative for puritis,  rash and jaundice  Hematologic/lymphatic: no easy bruising, no new lumps or bumps      Physical Exam:    Vitals:    10/16/24 0843   BP: 109/75   Pulse: 75         Constitutional: Well appearing / communicating without difficutly.  NAD.  Eyes: EOM I Bilaterally  Head/Face: Normocephalic.   Negative paranasal sinus pressure/tenderness.  Salivary glands WNL.  House Brackmann I Bilaterally.    Right Ear: Auricle normal appearance. External Auditory Canal within normal limits no lesions or masses,TM w/o masses/lesions/perforations. TM mobility noted.   Left Ear: Auricle normal appearance. External Auditory Canal within normal limits no lesions or masses,TM w/o masses/lesions/perforations. TM mobility noted.  Nose: No gross nasal septal deviation. Inferior Turbinates 3+ bilaterally. No septal perforation. No masses/lesions. External nasal skin appears normal without masses/lesions.  Oral Cavity: Gingiva/lips lower lip incision is well healed. Dentition/gingiva healthy appearing. Mucus membranes moist. Floor of mouth soft, no masses palpated. Oral Tongue mobile. Hard Palate appears normal.    Oropharynx: Base of tongue appears normal. No masses/lesions noted. Tonsillar fossa/pharyngeal wall without lesions. Posterior oropharynx WNL.  Soft palate without masses. Midline uvula.   Neck/Lymphatic: No LAD I-VI bilaterally.  No thyromegaly.  No masses noted on exam.      Pathology 10/2/2024:   1. Minor salivary gland, lower lip, biopsy:   - Benign salivary gland with mild chronic inflammation, predominantly plasmacytic   - See comment     2. Minor salivary gland, lower lip, biopsy:   - Benign salivary gland with mild mixed inflammation   - See comment     Comment: The findings do not fulfill the histologic criteria for Sjogren's syndrome (focus score less than 1). Two small adjacent foci of lymphoplasmacytic inflammation are identified, which do not meet the criteria for a &quot;significant lymphocytic   focus.&quot; The findings may be seen in chronic sialadenitis. This case was reviewed with Dr. JENNIFER Quiroga, [facility], who agrees with the interpretation.     Assessment:    ICD-10-CM ICD-9-CM    1. Xerostomia  K11.7 527.7       2. Sicca, unspecified type  M35.00 710.2           The primary encounter  diagnosis was Xerostomia. A diagnosis of Sicca, unspecified type was also pertinent to this visit.      Plan:  No orders of the defined types were placed in this encounter.    Reassurance lower lip biopsy site has healed well  Discussed pathology results  Keep follow up with rheumatology    Meena Rock MD

## 2024-12-05 ENCOUNTER — TELEPHONE (OUTPATIENT)
Dept: RHEUMATOLOGY | Facility: CLINIC | Age: 64
End: 2024-12-05
Payer: COMMERCIAL

## 2024-12-05 NOTE — TELEPHONE ENCOUNTER
----- Message from Chuckie sent at 12/5/2024 10:08 AM CST -----  Contact: pt  Type: Requesting  refill         Who Called: PT  Regarding: would like to know why medication was denied  nebivoloL (BYSTOLIC) 2.5 MG Tab 30 tablet Sig - Route: Take 1 tablet (2.5 mg total) by mouth once daily. - Oral  Sent to pharmacy as: nebivoloL (BYSTOLIC) 2.5 MG Tab    Would the patient rather a call back or a response via [employer]? Call back    Best Call Back Number: [phone]     Additional Information:  [id number] [place] [address] [address] AT [facility] [address]   Phone: [phone]  Fax: [phone]

## 2024-12-18 ENCOUNTER — HOSPITAL ENCOUNTER (OUTPATIENT)
Dept: PULMONOLOGY | Facility: HOSPITAL | Age: 64
Discharge: HOME | End: 2024-12-18
Attending: INTERNAL MEDICINE
Payer: COMMERCIAL

## 2024-12-18 ENCOUNTER — HOSPITAL ENCOUNTER (OUTPATIENT)
Dept: RADIOLOGY | Facility: HOSPITAL | Age: 64
Discharge: HOME | End: 2024-12-18
Attending: INTERNAL MEDICINE
Payer: COMMERCIAL

## 2024-12-18 DIAGNOSIS — J84.9 ILD (INTERSTITIAL LUNG DISEASE): ICD-10-CM

## 2024-12-18 PROCEDURE — 94729 DIFFUSING CAPACITY: CPT | Performed by: INTERNAL MEDICINE

## 2024-12-18 PROCEDURE — 94726 PLETHYSMOGRAPHY LUNG VOLUMES: CPT | Performed by: INTERNAL MEDICINE

## 2024-12-18 PROCEDURE — 94010 BREATHING CAPACITY TEST: CPT | Performed by: INTERNAL MEDICINE

## 2024-12-18 PROCEDURE — 71250 CT THORAX DX C-: CPT | Mod: TC

## 2024-12-18 PROCEDURE — 71250 CT THORAX DX C-: CPT | Mod: 26,,, | Performed by: RADIOLOGY

## 2024-12-19 LAB
DLCO SINGLE BREATH LLN: 15.48
DLCO SINGLE BREATH PRE REF: 72.4 %
DLCO SINGLE BREATH REF: 21.21
DLCOC SBVA LLN: 3.02
DLCOC SBVA REF: 4.61
DLCOC SINGLE BREATH LLN: 15.48
DLCOC SINGLE BREATH REF: 21.21
DLCOCSBVAULN: 6.2
DLCOCSINGLEBREATHULN: 26.95
DLCOSINGLEBREATHULN: 26.95
DLCOSINGLEBREATHZSCORE: -1.68
DLCOVA LLN: 3.02
DLCOVA PRE REF: 95.8 %
DLCOVA PRE: 4.41 ML/(MIN*MMHG*L) (ref 3.02–6.2)
DLCOVA REF: 4.61
DLCOVAULN: 6.2
ERV LLN: -16449.25
ERV PRE REF: 64.4 %
ERV REF: 0.75
ERVULN: ABNORMAL
FEF 25 75 LLN: 1.38
FEF 25 75 PRE REF: 108.4 %
FEF 25 75 REF: 2.78
FEV05 LLN: 0.86
FEV05 REF: 1.72
FEV1 FVC LLN: 67
FEV1 FVC PRE REF: 107 %
FEV1 FVC REF: 79
FEV1 LLN: 1.71
FEV1 PRE REF: 103.4 %
FEV1 REF: 2.27
FEV1FVCZSCORE: 0.84
FEV1ZSCORE: 0.23
FRCPLETH LLN: 1.77
FRCPLETH PREREF: 71.2 %
FRCPLETH REF: 2.59
FRCPLETHULN: 3.41
FVC LLN: 2.17
FVC PRE REF: 96.2 %
FVC REF: 2.88
FVCZSCORE: -0.25
IVC PRE: 2.42 L (ref 2.17–3.63)
IVC SINGLE BREATH LLN: 2.17
IVC SINGLE BREATH PRE REF: 84.1 %
IVC SINGLE BREATH REF: 2.88
IVCSINGLEBREATHULN: 3.63
LLN IC: -16448.18
PEF LLN: 4.27
PEF PRE REF: 72.7 %
PEF REF: 5.88
PHYSICIAN COMMENT: ABNORMAL
PRE DLCO: 15.36 ML/(MIN*MMHG) (ref 15.48–26.95)
PRE ERV: 0.48 L (ref -16449.25–16450.75)
PRE FEF 25 75: 3.01 L/S (ref 1.38–4.18)
PRE FET 100: 6.32 SEC
PRE FEV05 REF: 106.5 %
PRE FEV1 FVC: 84.89 % (ref 67.03–89.87)
PRE FEV1: 2.35 L (ref 1.71–2.82)
PRE FEV5: 1.83 L (ref 0.86–2.57)
PRE FRC PL: 1.84 L (ref 1.77–3.41)
PRE FVC: 2.77 L (ref 2.17–3.63)
PRE IC: 2.29 L (ref -16448.18–16451.82)
PRE PEF: 4.27 L/S (ref 4.27–7.48)
PRE REF IC: 125.6 %
PRE RV: 1.36 L (ref 1.27–2.42)
PRE TLC: 4.13 L (ref 3.62–5.59)
RAW PRE REF: 90.4 %
RAW PRE: 2.77 CMH2O*S/L (ref 3.06–3.06)
RAW REF: 3.06
REF IC: 1.82
RV LLN: 1.27
RV PRE REF: 73.9 %
RV REF: 1.84
RVTLC LLN: 30
RVTLC PRE REF: 82.2 %
RVTLC PRE: 32.93 % (ref 30.45–49.63)
RVTLC REF: 40
RVTLCULN: 50
RVULN: 2.42
SGAW PRE REF: 166.9 %
SGAW PRE: 0.17 1/(CMH2O*S) (ref 0.1–0.1)
SGAW REF: 0.1
TLC LLN: 3.62
TLC PRE REF: 89.8 %
TLC REF: 4.6
TLC ULN: 5.59
TLCZSCORE: -0.78
ULN IC: ABNORMAL
VA PRE: 3.48 L (ref 4.45–4.45)
VA SINGLE BREATH LLN: 4.45
VA SINGLE BREATH PRE REF: 78.2 %
VA SINGLE BREATH REF: 4.45
VASINGLEBREATHULN: 4.45
VC LLN: 2.17
VC PRE REF: 96.2 %
VC PRE: 2.77 L (ref 2.17–3.63)
VC REF: 2.88
VC ULN: 3.63

## 2025-01-09 ENCOUNTER — OFFICE VISIT (OUTPATIENT)
Dept: RHEUMATOLOGY | Facility: CLINIC | Age: 65
End: 2025-01-09
Payer: COMMERCIAL

## 2025-01-09 DIAGNOSIS — M79.642 PAIN IN BOTH HANDS: ICD-10-CM

## 2025-01-09 DIAGNOSIS — M79.641 PAIN IN BOTH HANDS: ICD-10-CM

## 2025-01-09 DIAGNOSIS — I73.00 RAYNAUD'S DISEASE WITHOUT GANGRENE: ICD-10-CM

## 2025-01-09 DIAGNOSIS — I27.9 CHRONIC PULMONARY HEART DISEASE: ICD-10-CM

## 2025-01-09 DIAGNOSIS — G62.9 NEUROPATHY: ICD-10-CM

## 2025-01-09 DIAGNOSIS — Z82.69 FAMILY HISTORY OF CREST SYNDROME: ICD-10-CM

## 2025-01-09 DIAGNOSIS — M35.02 SJOGREN'S SYNDROME WITH LUNG INVOLVEMENT: Primary | ICD-10-CM

## 2025-01-09 DIAGNOSIS — R76.8 POSITIVE ANA (ANTINUCLEAR ANTIBODY): ICD-10-CM

## 2025-01-09 DIAGNOSIS — R23.1 LIVEDO RETICULARIS: ICD-10-CM

## 2025-01-09 PROCEDURE — 99214 OFFICE O/P EST MOD 30 MIN: CPT | Mod: S$GLB,,, | Performed by: STUDENT IN AN ORGANIZED HEALTH CARE EDUCATION/TRAINING PROGRAM

## 2025-01-09 PROCEDURE — 99999 PR PBB SHADOW E&M-EST. PATIENT-LVL III: CPT | Mod: PBBFAC,,, | Performed by: STUDENT IN AN ORGANIZED HEALTH CARE EDUCATION/TRAINING PROGRAM

## 2025-01-09 PROCEDURE — 1159F MED LIST DOCD IN RCRD: CPT | Mod: CPTII,S$GLB,, | Performed by: STUDENT IN AN ORGANIZED HEALTH CARE EDUCATION/TRAINING PROGRAM

## 2025-01-09 RX ORDER — NEBIVOLOL 2.5 MG/1
2.5 TABLET ORAL DAILY
Qty: 30 TABLET | Refills: 6 | Status: SHIPPED | OUTPATIENT
Start: 2025-01-09

## 2025-01-13 NOTE — PROGRESS NOTES
Answers submitted by the patient for this visit:  Rheumatology Questionnaire (Submitted on 2024)  fever: No  eye redness: Yes  mouth sores: No  headaches: Yes  shortness of breath: Yes  chest pain: No  trouble swallowing: No  diarrhea: No  constipation: Yes  unexpected weight change: Yes  genital sore: No  dysuria: No  During the last 3 days, have you had a skin rash?: No  Bruises or bleeds easily: Yes  cough: No         RHEUMATOLOGY OUTPATIENT CLINIC NOTE    2025    Attending Rheumatologist: Naila Paulson  Primary Care Provider: Corey Blas MD   Physician Requesting Consultation: No referring provider defined for this encounter.  Chief Complaint/Reason For Consultation:  Interstitial Lung Disease    And ILD with positive DOMINIC  Subjective:       HPI  Nat Torres is a 64 y.o. White female with pmhx noted below referred for positive DOMINIC and RP in setting of ILD.  when came home from [facility].  Dr. Ríos because of a positive DOMINIC and a rash in arms and hands and monitoring then diagnosed with Fibromyalgia.  Never saw another Rheumatology  before she was going to have shoulder surgery and wanted to male sure no AI disease-- Dr. Abernathy no autoimmune disease and had Rotator cuff repair   3 yrs ago RP started -- lasts 1-2 hours and no inciting triggers and happens in both toes and fingers. No specific pattern.   Hands getting erythematous like a glove pattern down the wrist bilateral.     Aunt  of crest -- not sure if lung problems   Cousin Myasthenia Gravis   Sister with CIDP on IVIG and doing well  Sister Fibromyalgia   2-3 years ago started with RP     2 yrs ago  - 62th birthday she was at a museum and she ws SOB -- cardiac work up- nothing like Pulm HTN   Had [facility] doctor he said pulm htn- aldactone   Dr. Ayala -- [facility] - no pulm htn   RP, livedo reticularis -- ? Prinzmetal angina, 2 DVT post op -- has IVC filter and baby aspirin to prevent micro Pes    Blood work form November -  December 2023:  APS negative    LV Patient here for follow up   Patient reports that she started taking the Nifedipine but since off Nebivolol she reports her smart watch is  reading from 60-120s.   She is still feeling SOB     1/9/2025-- Patient here fro follow up   Patient RP controlled   Needs Nebivolol refill   ILD not showing in CT chest done recently   Patient having hand pain and swelling with prolonged morning stiffness     Review of Systems   Constitutional:  Positive for unexpected weight change. Negative for fever.   HENT:  Negative for mouth sores and trouble swallowing.    Eyes:  Positive for redness.   Respiratory:  Positive for shortness of breath. Negative for cough.    Cardiovascular:  Negative for chest pain.   Gastrointestinal:  Positive for constipation. Negative for diarrhea.   Genitourinary:  Negative for dysuria and genital sores.   Integumentary:  Negative for rash.   Neurological:  Positive for headaches.   Hematological:  Bruises/bleeds easily.    Dry mouth and eyes   Pressure chest that limits breathing -- iron bra   No chemo therapy or radiation   Was anastrazole but did  not tolerate     Stiffness and pain but no joint swelling       Chronic comorbid conditions affecting medical decision making today:  Past Medical History:   Diagnosis Date    History of breast cancer 02/17/2024    History of deep venous thrombosis 02/17/2024    Hypothyroidism 02/17/2024    Peptic ulcer disease with hemorrhage 2013    Positive DOMINIC (antinuclear antibody) 1:160 speckled pattern 02/17/2024    Presence of IVC filter 02/17/2024    Primary hypertension 02/17/2024    Pulmonary hypertension 02/17/2024    Raynaud's disease without gangrene 02/17/2024     Past Surgical History:   Procedure Laterality Date    APPENDECTOMY      BILATERAL MASTECTOMY Bilateral     Breast cancer on right    BREAST SURGERY      DEN FILTER PLACEMENT      HYSTERECTOMY      LYSIS OF ADHESIONS      Removal of ovarian cyst      RIGHT  HEART CATHETERIZATION Right 2/21/2024    Procedure: INSERTION, CATHETER, RIGHT HEART;  Surgeon: Lj Ayala Jr., MD;  Location: [facility] CATH LAB;  Service: Cardiology;  Laterality: Right;    ROTATOR CUFF REPAIR      WRIST SURGERY Right      No family history on file.  Social History     Substance and Sexual Activity   Alcohol Use Yes    Comment: 1-2 oz per day     Social History     Tobacco Use   Smoking Status Never   Smokeless Tobacco Never     Social History     Substance and Sexual Activity   Drug Use Never       Current Outpatient Medications:     amitriptyline (ELAVIL) 10 MG tablet, Take 1 tablet by mouth every evening., Disp: , Rfl:     amoxicillin (AMOXIL) 500 MG Tab, as needed., Disp: , Rfl:     aspirin (ECOTRIN) 81 MG EC tablet, Take 81 mg by mouth once daily., Disp: , Rfl:     azelastine (ASTELIN) 137 mcg (0.1 %) nasal spray, 1 spray by Nasal route once daily., Disp: , Rfl:     D3-nhrreuc-brbbjicgb-acetylcys (EB-C3 DR) 1.7-6-2-600 mg CpDR, Take 1 capsule by mouth once daily., Disp: , Rfl:     biotin-keratin (BIOTIN PLUS KERATIN) 10,000-100 mcg-mg Tab, Take 1 tablet by mouth Daily., Disp: , Rfl:     budesonide (PULMICORT) 0.5 mg/2 mL nebulizer solution, Take by nebulization as needed., Disp: , Rfl:     buPROPion (WELLBUTRIN XL) 150 MG TB24 tablet, Take 1 tablet by mouth once daily., Disp: , Rfl:     calcium phosphate trib/vit D3 (CITRACAL + D3, CALCIUM PHOS, ORAL), Take 1 tablet by mouth Daily., Disp: , Rfl:     docusate sodium (COLACE) 100 MG capsule, as needed., Disp: , Rfl:     famotidine (PEPCID) 40 MG tablet, Take 40 mg by mouth every morning., Disp: , Rfl:     fluticasone propionate (FLONASE ALLERGY RELIEF) 50 mcg/actuation nasal spray, 2 sprays by Each Nostril route Daily., Disp: , Rfl:     furosemide (LASIX) 20 MG tablet, Take 20 mg by mouth once daily., Disp: , Rfl:     hydroxychloroquine (PLAQUENIL) 200 mg tablet, Take 2 tablets (400 mg total) by mouth once daily., Disp: 60 tablet, Rfl:  11    levothyroxine (SYNTHROID) 100 MCG tablet, Take 1 tablet by mouth once daily., Disp: , Rfl:     LORazepam (ATIVAN) 1 MG tablet, Take 1 mg by mouth every 8 (eight) hours as needed for Anxiety., Disp: , Rfl:     NIFEdipine (PROCARDIA-XL) 30 MG (OSM) 24 hr tablet, Take 1 tablet (30 mg total) by mouth once daily., Disp: 30 tablet, Rfl: 11    spironolactone (ALDACTONE) 25 MG tablet, Take 25 mg by mouth once daily., Disp: , Rfl:     nebivoloL (BYSTOLIC) 2.5 MG Tab, Take 1 tablet (2.5 mg total) by mouth once daily., Disp: 30 tablet, Rfl: 6     Objective:         There were no vitals filed for this visit.    Physical Exam   Constitutional: normal appearance.   HENT:   Head: Normocephalic.   Nose: Nose normal.   Mouth/Throat: Mucous membranes are moist.   Eyes: Conjunctivae are normal.   Cardiovascular: Normal rate and regular rhythm.   Pulmonary/Chest: Effort normal.   Musculoskeletal:         General: Tenderness present. No swelling. Normal range of motion.      Cervical back: Normal range of motion.   Neurological: She is alert.   Psychiatric: Mood normal.                         Reviewed old and all outside pertinent medical records available.    All lab results personally reviewed and interpreted by me.  Lab Results   Component Value Date    WBC 5.44 02/21/2024    HGB 14.1 02/21/2024    HCT 44.2 02/21/2024    MCV 89 02/21/2024    MCH 28.4 02/21/2024    MCHC 31.9 (L) 02/21/2024    RDW 12.5 02/21/2024     02/21/2024    MPV 8.7 (L) 02/21/2024       Lab Results   Component Value Date     02/21/2024    K 3.7 02/21/2024     02/21/2024    CO2 29 02/21/2024    GLU 77 02/21/2024    BUN 6 (L) 02/21/2024    CALCIUM 8.8 02/21/2024    PROT 7.2 01/11/2024    ALBUMIN 3.5 01/11/2024    BILITOT 0.9 01/11/2024    AST 18 01/11/2024    ALKPHOS 83 01/11/2024    ALT 10 01/11/2024         Imaging:  All imaging reviewed and independently interpreted by me.         ASSESSMENT / PLAN:     Nat Torres is a 64 y.o.  White female with:      1. Sjogren's disease  2. Raynaud's disease without gangrene  3. Neuropathy   4. Livedo reticularis   5. Positive DOMINIC (antinuclear antibody)  - Patient with keratoconjunctivitis sicca symptoms, unclear neuropathy etiology, arthralgia, fatigue, severe RP, - this seems to be more consistent with SjS -.   - Continue Nifedipine   - need to contact cardiology for HR control   - Pilocarpine for dry mouth if lifestyle modifications not enough   - ILD - had recent appt with Dr. Ayala - he suggested MMF(not in his note but per patient recollection) but new imaging with no evidence of ILD   - I suggested medication for inflammatory arthritis but wants to do more testing xray hand      7. Family history of CREST syndrome  - CREST AB negative but this can also be a MCTD with some scleroderma features   - Ambulatory referral/consult to Rheumatology     8. Other specified counseling  - over 10 minutes spent regarding below topics:  - Immunization counseling done.  - Weight loss counseling done.  - Nutrition and exercise counseling.  - Limitation of alcohol consumption.  - Regular exercise:  Aerobic and resistance.  - Medication counseling provided.    9. Obesity  - would benefit from decreasing at least 10% of body weight.  - recommended goal of losing 1 lb per week.  - consider nutritionist evaluation.  - would consider screening for SANGITA per PMD.    No follow-ups on file.    Method of contact with patient concerns: [facility]    Disclaimer:  This note is prepared using voice recognition software and as such is likely to have errors and has not been proof read. Please contact me for questions.     Time spent: 40 minutes in face to face discussion concerning diagnosis, prognosis, review of lab and test results, benefits of treatment as well as management of disease, counseling of patient and coordination of care between various health care providers.  Greater than half the time spent was  used for coordination of care and counseling of patient.    Naila Paulson M.D.  Rheumatology Department   [facility]

## 2025-01-15 ENCOUNTER — HOSPITAL ENCOUNTER (OUTPATIENT)
Dept: RADIOLOGY | Facility: HOSPITAL | Age: 65
Discharge: HOME | End: 2025-01-15
Attending: STUDENT IN AN ORGANIZED HEALTH CARE EDUCATION/TRAINING PROGRAM
Payer: COMMERCIAL

## 2025-01-15 DIAGNOSIS — M79.642 PAIN IN BOTH HANDS: ICD-10-CM

## 2025-01-15 DIAGNOSIS — M79.641 PAIN IN BOTH HANDS: ICD-10-CM

## 2025-01-15 PROCEDURE — 73130 X-RAY EXAM OF HAND: CPT | Mod: 26,,, | Performed by: RADIOLOGY

## 2025-01-15 PROCEDURE — 73130 X-RAY EXAM OF HAND: CPT | Mod: TC,50

## 2025-01-29 ENCOUNTER — OFFICE VISIT (OUTPATIENT)
Dept: PULMONOLOGY | Facility: CLINIC | Age: 65
End: 2025-01-29
Payer: COMMERCIAL

## 2025-01-29 VITALS
BODY MASS INDEX: 31.13 KG/M2 | DIASTOLIC BLOOD PRESSURE: 74 MMHG | SYSTOLIC BLOOD PRESSURE: 112 MMHG | OXYGEN SATURATION: 100 % | HEIGHT: 63 IN | HEART RATE: 56 BPM | WEIGHT: 175.69 LBS

## 2025-01-29 DIAGNOSIS — J84.9 ILD (INTERSTITIAL LUNG DISEASE): Primary | ICD-10-CM

## 2025-01-29 DIAGNOSIS — R76.8 POSITIVE ANA (ANTINUCLEAR ANTIBODY): ICD-10-CM

## 2025-01-29 PROCEDURE — 1159F MED LIST DOCD IN RCRD: CPT | Mod: CPTII,S$GLB,, | Performed by: INTERNAL MEDICINE

## 2025-01-29 PROCEDURE — 3008F BODY MASS INDEX DOCD: CPT | Mod: CPTII,S$GLB,, | Performed by: INTERNAL MEDICINE

## 2025-01-29 PROCEDURE — 3074F SYST BP LT 130 MM HG: CPT | Mod: CPTII,S$GLB,, | Performed by: INTERNAL MEDICINE

## 2025-01-29 PROCEDURE — 99214 OFFICE O/P EST MOD 30 MIN: CPT | Mod: S$GLB,,, | Performed by: INTERNAL MEDICINE

## 2025-01-29 PROCEDURE — G2211 COMPLEX E/M VISIT ADD ON: HCPCS | Mod: S$GLB,,, | Performed by: INTERNAL MEDICINE

## 2025-01-29 PROCEDURE — 99999 PR PBB SHADOW E&M-EST. PATIENT-LVL IV: CPT | Mod: PBBFAC,,, | Performed by: INTERNAL MEDICINE

## 2025-01-29 PROCEDURE — 3078F DIAST BP <80 MM HG: CPT | Mod: CPTII,S$GLB,, | Performed by: INTERNAL MEDICINE

## 2025-01-29 RX ORDER — MOMETASONE FUROATE 100 UG/1
2 AEROSOL RESPIRATORY (INHALATION) 2 TIMES DAILY
Qty: 13 G | Refills: 11 | Status: SHIPPED | OUTPATIENT
Start: 2025-01-29

## 2025-01-29 NOTE — ASSESSMENT & PLAN NOTE
Rheum note reviewed.   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 No immunomodulatory needs from a pulmonary perspective but will monitor with serial testing.

## 2025-01-29 NOTE — PROGRESS NOTES
Subjective:       Patient ID: Nat Torres is a 64 y.o. female.  The patient's last visit with me was on 8/13/2024.     Patient still with intermittent AGUIRRE. She now notes that some days she can walk many blocks without dyspnea and other blocks is symptomatic with minimal effort. High HR clearly worsens her symptoms. RP is improved with nifedipine but feels the HR issues are worse on lower dose nebivolol.  Review of Systems    Objective:      Vitals:    01/29/25 1322   BP: 112/74   Pulse: (!) 56     Wt Readings from Last 3 Encounters:   01/29/25 79.7 kg (175 lb 11.3 oz)   10/02/24 80 kg (176 lb 5.9 oz)   08/22/24 80.3 kg (177 lb 0.5 oz)     Temp Readings from Last 3 Encounters:   08/22/24 97.6 °F (36.4 °C) (Oral)   02/21/24 98.1 °F (36.7 °C) (Temporal)     BP Readings from Last 3 Encounters:   01/29/25 112/74   10/16/24 109/75   10/02/24 114/75     Pulse Readings from Last 3 Encounters:   01/29/25 (!) 56   10/16/24 75   10/02/24 75       Physical Exam   Constitutional: She appears well-developed and well-nourished.   Pulmonary/Chest: Effort normal and breath sounds normal. She has no wheezes. She has no rales.   Vitals reviewed.    CBC  Lab Results   Component Value Date    WBC 5.44 02/21/2024    HGB 14.1 02/21/2024    HCT 44.2 02/21/2024    MCV 89 02/21/2024     02/21/2024         CMP  Sodium   Date Value Ref Range Status   02/21/2024 143 136 - 145 mmol/L Final     Potassium   Date Value Ref Range Status   02/21/2024 3.7 3.5 - 5.1 mmol/L Final     Chloride   Date Value Ref Range Status   02/21/2024 106 95 - 110 mmol/L Final     CO2   Date Value Ref Range Status   02/21/2024 29 23 - 29 mmol/L Final     Glucose   Date Value Ref Range Status   02/21/2024 77 70 - 110 mg/dL Final     BUN   Date Value Ref Range Status   02/21/2024 6 (L) 8 - 23 mg/dL Final     Creatinine   Date Value Ref Range Status   02/21/2024 0.9 0.5 - 1.4 mg/dL Final     Calcium   Date Value Ref Range Status   02/21/2024 8.8 8.7 - 10.5  "mg/dL Final     Total Protein   Date Value Ref Range Status   01/11/2024 7.2 6.0 - 8.4 g/dL Final     Albumin   Date Value Ref Range Status   01/11/2024 3.5 3.5 - 5.2 g/dL Final     Total Bilirubin   Date Value Ref Range Status   01/11/2024 0.9 0.1 - 1.0 mg/dL Final     Comment:     For infants and newborns, interpretation of results should be based  on gestational age, weight and in agreement with clinical  observations.    Premature Infant recommended reference ranges:  Up to 24 hours.............<8.0 mg/dL  Up to 48 hours............<12.0 mg/dL  3-5 days..................<15.0 mg/dL  6-29 days.................<15.0 mg/dL       Alkaline Phosphatase   Date Value Ref Range Status   01/11/2024 83 55 - 135 U/L Final     AST   Date Value Ref Range Status   01/11/2024 18 10 - 40 U/L Final     ALT   Date Value Ref Range Status   01/11/2024 10 10 - 44 U/L Final     Anion Gap   Date Value Ref Range Status   02/21/2024 8 8 - 16 mmol/L Final     eGFR   Date Value Ref Range Status   02/21/2024 >60.0 >60 mL/min/1.73 m^2 Final       ABG  No results found for: "PH", "PO2", "PCO2"        Personal Diagnostic Review  I have personally reviewed the following data and added my own interpretation as below:  HRCT 12/18/24 images personally reviewed and shows normal lung parenchyma. The LLL ILD described on OSH imaging is not present.  PFTs now normalized and improved from prior.  Rheum notes reviewed  PCP notes reviewed      1/29/2025     1:22 PM 10/2/2024    11:12 AM 8/22/2024    12:23 PM 8/13/2024     8:46 AM 4/24/2024    10:13 AM 4/3/2024     8:47 AM 3/13/2024    10:15 AM   Pulmonary Function Tests   SpO2 100 %  97 % 97 %  95 % 100 %   Height 5' 3" (1.6 m) 5' 3" (1.6 m) 5' 3" (1.6 m) 5' 3" (1.6 m) 5' 3" (1.6 m) 5' 3" (1.6 m) 5' 3" (1.6 m)   Weight 79.7 kg (175 lb 11.3 oz) 80 kg (176 lb 5.9 oz) 80.3 kg (177 lb 0.5 oz) 79.2 kg (174 lb 9.7 oz) 78.9 kg (173 lb 15.1 oz) 77.7 kg (171 lb 4.8 oz) 76 kg (167 lb 8.8 oz)   BMI (Calculated) " 31.1 31.3 31.4 30.9 30.8 30.4 29.7         Assessment:       1. ILD (interstitial lung disease)    2. Positive ODMINIC (antinuclear antibody) 1:160 speckled pattern        Outpatient Encounter Medications as of 1/29/2025   Medication Sig Dispense Refill    amitriptyline (ELAVIL) 10 MG tablet Take 1 tablet by mouth every evening.      amoxicillin (AMOXIL) 500 MG Tab as needed.      aspirin (ECOTRIN) 81 MG EC tablet Take 81 mg by mouth once daily.      azelastine (ASTELIN) 137 mcg (0.1 %) nasal spray 1 spray by Nasal route once daily.      Z4-smuduzc-jdhgnpbgk-acetylcys (EB-C3 DR) 1.7-6-2-600 mg CpDR Take 1 capsule by mouth once daily.      biotin-keratin (BIOTIN PLUS KERATIN) 10,000-100 mcg-mg Tab Take 1 tablet by mouth Daily.      budesonide (PULMICORT) 0.5 mg/2 mL nebulizer solution Take by nebulization as needed.      buPROPion (WELLBUTRIN XL) 150 MG TB24 tablet Take 1 tablet by mouth once daily.      calcium phosphate trib/vit D3 (CITRACAL + D3, CALCIUM PHOS, ORAL) Take 1 tablet by mouth Daily.      docusate sodium (COLACE) 100 MG capsule as needed.      famotidine (PEPCID) 40 MG tablet Take 40 mg by mouth every morning.      fluticasone propionate (FLONASE ALLERGY RELIEF) 50 mcg/actuation nasal spray 2 sprays by Each Nostril route Daily.      furosemide (LASIX) 20 MG tablet Take 20 mg by mouth once daily.      hydroxychloroquine (PLAQUENIL) 200 mg tablet Take 2 tablets (400 mg total) by mouth once daily. 60 tablet 11    levothyroxine (SYNTHROID) 100 MCG tablet Take 1 tablet by mouth once daily.      LORazepam (ATIVAN) 1 MG tablet Take 1 mg by mouth every 8 (eight) hours as needed for Anxiety.      nebivoloL (BYSTOLIC) 2.5 MG Tab Take 1 tablet (2.5 mg total) by mouth once daily. 30 tablet 6    NIFEdipine (PROCARDIA-XL) 30 MG (OSM) 24 hr tablet Take 1 tablet (30 mg total) by mouth once daily. 30 tablet 11    spironolactone (ALDACTONE) 25 MG tablet Take 25 mg by mouth once daily.      mometasone  (ASMANEX HFA) 100 mcg/actuation HFAA Inhale 2 puffs into the lungs 2 (two) times a day. Controller 13 g 11    [DISCONTINUED] nebivoloL (BYSTOLIC) 2.5 MG Tab Take 1 tablet (2.5 mg total) by mouth once daily. 30 tablet 6     No facility-administered encounter medications on file as of 1/29/2025.     1. ILD (interstitial lung disease)  - Complete PFT with bronchodilator; Future    2. Positive DOMINIC (antinuclear antibody) 1:160 speckled pattern    Plan:     Problem List Items Addressed This Visit          Pulmonary    ILD (interstitial lung disease) - Primary    Current Assessment & Plan     Significant chronic condition to be followed longitudinally with following long term treatment plan.     Moderate reduction in FEV1 with restriction in Jan 2024. 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    -PFTs improved July 2024 (first testing at [facility]) and have normalized 12/24  PFTs q6 months given degree of symptoms and active CTD symptoms  HRCT in late 2024 shows resolution of ILD changes noted in Dec 2023 on OSH imaging. Suspect may have all been active inflammation now resolved  -could have post-inflammatory reactive airways. Limited by her tachycardia episodes but should tolerate low dose ICS.         Relevant Orders    Complete PFT with bronchodilator       Immunology/Multi System    Positive DOMINIC (antinuclear antibody) 1:160 speckled pattern    Current Assessment & Plan     Rheum note reviewed.   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 No immunomodulatory needs from a pulmonary perspective but will monitor with serial testing.            Please note Overview Notes are historic documentation. Please review A/P for current updates.  No follow-ups on file.    Future Appointments   Date Time Provider Department Center   2/19/2025 10:00 AM Naila Paulson MD [facility] RHEUM Donavan Ayala MD

## 2025-01-29 NOTE — ASSESSMENT & PLAN NOTE
Significant chronic condition to be followed longitudinally with following long term treatment plan.     Moderate reduction in FEV1 with restriction in Jan 2024. 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    -PFTs improved July 2024 (first testing at [facility]) and have normalized 12/24  PFTs q6 months given degree of symptoms and active CTD symptoms  HRCT in late 2024 shows resolution of ILD changes noted in Dec 2023 on OSH imaging. Suspect may have all been active inflammation now resolved  -could have post-inflammatory reactive airways. Limited by her tachycardia episodes but should tolerate low dose ICS.

## 2025-02-19 ENCOUNTER — OFFICE VISIT (OUTPATIENT)
Dept: RHEUMATOLOGY | Facility: CLINIC | Age: 65
End: 2025-02-19
Payer: COMMERCIAL

## 2025-02-19 VITALS
BODY MASS INDEX: 31.17 KG/M2 | WEIGHT: 175.94 LBS | OXYGEN SATURATION: 97 % | RESPIRATION RATE: 16 BRPM | TEMPERATURE: 98 F | DIASTOLIC BLOOD PRESSURE: 71 MMHG | HEART RATE: 76 BPM | SYSTOLIC BLOOD PRESSURE: 111 MMHG | HEIGHT: 63 IN

## 2025-02-19 DIAGNOSIS — R76.8 POSITIVE ANA (ANTINUCLEAR ANTIBODY): ICD-10-CM

## 2025-02-19 DIAGNOSIS — G62.9 NEUROPATHY: ICD-10-CM

## 2025-02-19 DIAGNOSIS — M35.02 SJOGREN'S SYNDROME WITH LUNG INVOLVEMENT: Primary | ICD-10-CM

## 2025-02-19 DIAGNOSIS — E66.09 CLASS 1 OBESITY DUE TO EXCESS CALORIES WITHOUT SERIOUS COMORBIDITY WITH BODY MASS INDEX (BMI) OF 30.0 TO 30.9 IN ADULT: ICD-10-CM

## 2025-02-19 DIAGNOSIS — Z82.69 FAMILY HISTORY OF CREST SYNDROME: ICD-10-CM

## 2025-02-19 DIAGNOSIS — E66.811 CLASS 1 OBESITY DUE TO EXCESS CALORIES WITHOUT SERIOUS COMORBIDITY WITH BODY MASS INDEX (BMI) OF 30.0 TO 30.9 IN ADULT: ICD-10-CM

## 2025-02-19 DIAGNOSIS — R23.1 LIVEDO RETICULARIS: ICD-10-CM

## 2025-02-19 DIAGNOSIS — I73.00 RAYNAUD'S DISEASE WITHOUT GANGRENE: ICD-10-CM

## 2025-02-19 PROCEDURE — 3074F SYST BP LT 130 MM HG: CPT | Mod: CPTII,S$GLB,, | Performed by: STUDENT IN AN ORGANIZED HEALTH CARE EDUCATION/TRAINING PROGRAM

## 2025-02-19 PROCEDURE — 99214 OFFICE O/P EST MOD 30 MIN: CPT | Mod: S$GLB,,, | Performed by: STUDENT IN AN ORGANIZED HEALTH CARE EDUCATION/TRAINING PROGRAM

## 2025-02-19 PROCEDURE — 3078F DIAST BP <80 MM HG: CPT | Mod: CPTII,S$GLB,, | Performed by: STUDENT IN AN ORGANIZED HEALTH CARE EDUCATION/TRAINING PROGRAM

## 2025-02-19 PROCEDURE — 99999 PR PBB SHADOW E&M-EST. PATIENT-LVL V: CPT | Mod: PBBFAC,,, | Performed by: STUDENT IN AN ORGANIZED HEALTH CARE EDUCATION/TRAINING PROGRAM

## 2025-02-19 PROCEDURE — 3008F BODY MASS INDEX DOCD: CPT | Mod: CPTII,S$GLB,, | Performed by: STUDENT IN AN ORGANIZED HEALTH CARE EDUCATION/TRAINING PROGRAM

## 2025-02-19 PROCEDURE — 1159F MED LIST DOCD IN RCRD: CPT | Mod: CPTII,S$GLB,, | Performed by: STUDENT IN AN ORGANIZED HEALTH CARE EDUCATION/TRAINING PROGRAM

## 2025-02-19 NOTE — PROGRESS NOTES
RHEUMATOLOGY OUTPATIENT CLINIC NOTE      Attending Rheumatologist: Naila Paulson  Primary Care Provider: Corey Blas MD   Physician Requesting Consultation: No referring provider defined for this encounter.  Chief Complaint/Reason For Consultation:  sjogren's syndrome, Joint Pain, and Disease Management    And ILD with positive DOMINIC  Subjective:       HPI  Nat Torres is a 64 y.o. White female with pmhx noted below referred for positive DOMINIC and RP in setting of ILD.  when came home from [facility].  Dr. Ríos because of a positive DOMINIC and a rash in arms and hands and monitoring then diagnosed with Fibromyalgia.  Never saw another Rheumatology  before she was going to have shoulder surgery and wanted to male sure no AI disease-- Dr. Abernathy no autoimmune disease and had Rotator cuff repair   3 yrs ago RP started -- lasts 1-2 hours and no inciting triggers and happens in both toes and fingers. No specific pattern.   Hands getting erythematous like a glove pattern down the wrist bilateral.     Aunt  of crest -- not sure if lung problems   Cousin Myasthenia Gravis   Sister with CIDP on IVIG and doing well  Sister Fibromyalgia   2-3 years ago started with RP     2 yrs ago  - 62th birthday she was at a [employer] and she ws SOB -- cardiac work up- nothing like Pulm HTN   Had [facility] doctor he said pulm htn- aldactone   Dr. Ayala -- [facility] - no pulm htn   RP, livedo reticularis -- ? Prinzmetal angina, 2 DVT post op -- has IVC filter and baby aspirin to prevent micro Pes    Blood work form November - 2023:  APS negative    LV Patient here for follow up   Patient reports that she started taking the Nifedipine but since off Nebivolol she reports her smart watch is  reading from 60-120s.   She is still feeling SOB     2025-- Patient here fro follow up   Patient RP controlled   Needs Nebivolol refill   ILD not showing in CT chest done recently   Patient having hand pain and swelling  with prolonged morning stiffness     2/19/2025: Patient here for follow up   Patient still with RP but less with medication   Continues on Nebivolol  Patient with hand pain and stiffness   She is a but discourage because she feels she does not have a diagnosis     Review of Systems   Constitutional:  Positive for unexpected weight change. Negative for fever.   HENT:  Negative for mouth sores and trouble swallowing.    Eyes:  Positive for redness.   Respiratory:  Positive for shortness of breath. Negative for cough.    Cardiovascular:  Negative for chest pain.   Gastrointestinal:  Positive for constipation. Negative for diarrhea.   Genitourinary:  Negative for dysuria and genital sores.   Integumentary:  Negative for rash.   Neurological:  Positive for headaches.   Hematological:  Bruises/bleeds easily.    Dry mouth and eyes   Pressure chest that limits breathing -- iron bra   No chemo therapy or radiation   Was anastrazole but did  not tolerate     Stiffness and pain but no joint swelling       Chronic comorbid conditions affecting medical decision making today:  Past Medical History:   Diagnosis Date    History of breast cancer 02/17/2024    History of deep venous thrombosis 02/17/2024    Hypothyroidism 02/17/2024    Peptic ulcer disease with hemorrhage 2013    Positive DOMINIC (antinuclear antibody) 1:160 speckled pattern 02/17/2024    Presence of IVC filter 02/17/2024    Primary hypertension 02/17/2024    Pulmonary hypertension 02/17/2024    Raynaud's disease without gangrene 02/17/2024     Past Surgical History:   Procedure Laterality Date    APPENDECTOMY      BILATERAL MASTECTOMY Bilateral     Breast cancer on right    BREAST SURGERY      EDN FILTER PLACEMENT      HYSTERECTOMY      LYSIS OF ADHESIONS      Removal of ovarian cyst      RIGHT HEART CATHETERIZATION Right 2/21/2024    Procedure: INSERTION, CATHETER, RIGHT HEART;  Surgeon: Lj Ayala Jr., MD;  Location: [facility] CATH LAB;  Service:  Cardiology;  Laterality: Right;    ROTATOR CUFF REPAIR      WRIST SURGERY Right      No family history on file.  Social History     Substance and Sexual Activity   Alcohol Use Yes    Comment: 1-2 oz per day     Social History     Tobacco Use   Smoking Status Never   Smokeless Tobacco Never     Social History     Substance and Sexual Activity   Drug Use Never       Current Outpatient Medications:     amitriptyline (ELAVIL) 10 MG tablet, Take 1 tablet by mouth every evening., Disp: , Rfl:     amoxicillin (AMOXIL) 500 MG Tab, as needed., Disp: , Rfl:     aspirin (ECOTRIN) 81 MG EC tablet, Take 81 mg by mouth once daily., Disp: , Rfl:     azelastine (ASTELIN) 137 mcg (0.1 %) nasal spray, 1 spray by Nasal route once daily., Disp: , Rfl:     I8-yyybvks-jkpraivgz-acetylcys (EB-C3 DR) 1.7-6-2-600 mg CpDR, Take 1 capsule by mouth once daily., Disp: , Rfl:     biotin-keratin (BIOTIN PLUS KERATIN) 10,000-100 mcg-mg Tab, Take 1 tablet by mouth Daily., Disp: , Rfl:     budesonide (PULMICORT) 0.5 mg/2 mL nebulizer solution, Take by nebulization as needed., Disp: , Rfl:     buPROPion (WELLBUTRIN XL) 150 MG TB24 tablet, Take 1 tablet by mouth once daily., Disp: , Rfl:     calcium phosphate trib/vit D3 (CITRACAL + D3, CALCIUM PHOS, ORAL), Take 1 tablet by mouth Daily., Disp: , Rfl:     docusate sodium (COLACE) 100 MG capsule, as needed. (Patient taking differently: Take 100 mg by mouth once daily.), Disp: , Rfl:     famotidine (PEPCID) 40 MG tablet, Take 40 mg by mouth every morning., Disp: , Rfl:     fluticasone propionate (FLONASE ALLERGY RELIEF) 50 mcg/actuation nasal spray, 2 sprays by Each Nostril route Daily., Disp: , Rfl:     furosemide (LASIX) 20 MG tablet, Take 20 mg by mouth once daily., Disp: , Rfl:     hydroxychloroquine (PLAQUENIL) 200 mg tablet, Take 2 tablets (400 mg total) by mouth once daily., Disp: 60 tablet, Rfl: 11    levothyroxine (SYNTHROID) 100 MCG tablet, Take 1 tablet by mouth once daily., Disp: , Rfl:      LORazepam (ATIVAN) 1 MG tablet, Take 1 mg by mouth every 8 (eight) hours as needed for Anxiety., Disp: , Rfl:     mometasone (ASMANEX HFA) 100 mcg/actuation HFAA, Inhale 2 puffs into the lungs 2 (two) times a day. Controller, Disp: 13 g, Rfl: 11    nebivoloL (BYSTOLIC) 2.5 MG Tab, Take 1 tablet (2.5 mg total) by mouth once daily., Disp: 30 tablet, Rfl: 6    NIFEdipine (PROCARDIA-XL) 30 MG (OSM) 24 hr tablet, Take 1 tablet (30 mg total) by mouth once daily., Disp: 30 tablet, Rfl: 11    spironolactone (ALDACTONE) 25 MG tablet, Take 25 mg by mouth once daily., Disp: , Rfl:      Objective:         Vitals:    02/19/25 1026   BP: 111/71   Pulse: 76   Resp: 16   Temp: 97.9 °F (36.6 °C)       Physical Exam   Constitutional: normal appearance.   HENT:   Head: Normocephalic.   Nose: Nose normal.   Mouth/Throat: Mucous membranes are moist.   Eyes: Conjunctivae are normal.   Cardiovascular: Normal rate and regular rhythm.   Pulmonary/Chest: Effort normal.   Musculoskeletal:         General: Tenderness present. No swelling. Normal range of motion.      Cervical back: Normal range of motion.   Neurological: She is alert.   Psychiatric: Mood normal.                         Reviewed old and all outside pertinent medical records available.    All lab results personally reviewed and interpreted by me.  Lab Results   Component Value Date    WBC 5.44 02/21/2024    HGB 14.1 02/21/2024    HCT 44.2 02/21/2024    MCV 89 02/21/2024    MCH 28.4 02/21/2024    MCHC 31.9 (L) 02/21/2024    RDW 12.5 02/21/2024     02/21/2024    MPV 8.7 (L) 02/21/2024       Lab Results   Component Value Date     02/21/2024    K 3.7 02/21/2024     02/21/2024    CO2 29 02/21/2024    GLU 77 02/21/2024    BUN 6 (L) 02/21/2024    CALCIUM 8.8 02/21/2024    PROT 7.2 01/11/2024    ALBUMIN 3.5 01/11/2024    BILITOT 0.9 01/11/2024    AST 18 01/11/2024    ALKPHOS 83 01/11/2024    ALT 10 01/11/2024         Imaging:  All imaging reviewed and independently  interpreted by me.         ASSESSMENT / PLAN:     Nat Torres is a 64 y.o. White female with:      1. Sjogren's disease  2. Raynaud's disease without gangrene  3. Neuropathy   4. Livedo reticularis   5. Positive DOMINIC (antinuclear antibody)  - Patient with keratoconjunctivitis sicca symptoms, unclear neuropathy etiology, arthralgia, fatigue, severe RP, - this seems to be more consistent with SjS -.   - Continue Nifedipine   - need to contact cardiology for HR control   - Pilocarpine for dry mouth if lifestyle modifications not enough   - ILD - had recent appt with Dr. Ayala - he suggested MMF(not in his note but per patient recollection) but new imaging with no evidence of ILD   - I suggested medication for inflammatory arthritis but wants to do more testing xray hand  - xray showing OA no RA     7. Family history of CREST syndrome  - CREST AB negative but this can also be a MCTD with some scleroderma features   - Ambulatory referral/consult to Rheumatology     8. Other specified counseling  - over 10 minutes spent regarding below topics:  - Immunization counseling done.  - Weight loss counseling done.  - Nutrition and exercise counseling.  - Limitation of alcohol consumption.  - Regular exercise:  Aerobic and resistance.  - Medication counseling provided.    9. Obesity  - would benefit from decreasing at least 10% of body weight.  - recommended goal of losing 1 lb per week.  - consider nutritionist evaluation.  - would consider screening for SANGITA per PMD.    Follow up in about 3 months (around 5/19/2025).    Method of contact with patient concerns: Adryan attton Rheumatology    Disclaimer:  This note is prepared using voice recognition software and as such is likely to have errors and has not been proof read. Please contact me for questions.     Time spent: 30 minutes in face to face discussion concerning diagnosis, prognosis, review of lab and test results, benefits of treatment as well as management of  disease, counseling of patient and coordination of care between various health care providers.  Greater than half the time spent was used for coordination of care and counseling of patient.    Naila Paulson M.D.  Rheumatology Department   [facility]

## 2025-05-01 DIAGNOSIS — R76.8 POSITIVE ANA (ANTINUCLEAR ANTIBODY): ICD-10-CM

## 2025-05-01 DIAGNOSIS — I73.00 RAYNAUD'S DISEASE WITHOUT GANGRENE: ICD-10-CM

## 2025-05-01 RX ORDER — NIFEDIPINE 30 MG/1
30 TABLET, EXTENDED RELEASE ORAL DAILY
Qty: 30 TABLET | Refills: 11 | Status: SHIPPED | OUTPATIENT
Start: 2025-05-01 | End: 2026-05-01

## 2025-06-19 ENCOUNTER — TELEPHONE (OUTPATIENT)
Dept: RHEUMATOLOGY | Facility: CLINIC | Age: 65
End: 2025-06-19
Payer: COMMERCIAL

## 2025-06-19 NOTE — TELEPHONE ENCOUNTER
I returned patients call. She was concerned about her medication hydroxyehloraquine 200mg. I told her I would call [facility] for her.

## 2025-07-16 ENCOUNTER — OFFICE VISIT (OUTPATIENT)
Dept: RHEUMATOLOGY | Facility: CLINIC | Age: 65
End: 2025-07-16
Payer: COMMERCIAL

## 2025-07-16 VITALS
WEIGHT: 175.69 LBS | DIASTOLIC BLOOD PRESSURE: 76 MMHG | BODY MASS INDEX: 31.13 KG/M2 | TEMPERATURE: 98 F | HEART RATE: 82 BPM | SYSTOLIC BLOOD PRESSURE: 107 MMHG | HEIGHT: 63 IN | OXYGEN SATURATION: 98 %

## 2025-07-16 DIAGNOSIS — R76.8 POSITIVE ANA (ANTINUCLEAR ANTIBODY): ICD-10-CM

## 2025-07-16 DIAGNOSIS — G62.9 NEUROPATHY: ICD-10-CM

## 2025-07-16 DIAGNOSIS — I73.00 RAYNAUD'S DISEASE WITHOUT GANGRENE: ICD-10-CM

## 2025-07-16 DIAGNOSIS — E66.09 CLASS 1 OBESITY DUE TO EXCESS CALORIES WITHOUT SERIOUS COMORBIDITY WITH BODY MASS INDEX (BMI) OF 31.0 TO 31.9 IN ADULT: ICD-10-CM

## 2025-07-16 DIAGNOSIS — M35.02 SJOGREN'S SYNDROME WITH LUNG INVOLVEMENT: Primary | ICD-10-CM

## 2025-07-16 DIAGNOSIS — E66.811 CLASS 1 OBESITY DUE TO EXCESS CALORIES WITHOUT SERIOUS COMORBIDITY WITH BODY MASS INDEX (BMI) OF 31.0 TO 31.9 IN ADULT: ICD-10-CM

## 2025-07-16 DIAGNOSIS — M65.4 TENOSYNOVITIS, DE QUERVAIN: ICD-10-CM

## 2025-07-16 DIAGNOSIS — R23.1 LIVEDO RETICULARIS: ICD-10-CM

## 2025-07-16 PROCEDURE — 99214 OFFICE O/P EST MOD 30 MIN: CPT | Mod: 25,S$GLB,, | Performed by: STUDENT IN AN ORGANIZED HEALTH CARE EDUCATION/TRAINING PROGRAM

## 2025-07-16 PROCEDURE — 3078F DIAST BP <80 MM HG: CPT | Mod: CPTII,S$GLB,, | Performed by: STUDENT IN AN ORGANIZED HEALTH CARE EDUCATION/TRAINING PROGRAM

## 2025-07-16 PROCEDURE — 20550 NJX 1 TENDON SHEATH/LIGAMENT: CPT | Mod: 50,S$GLB,, | Performed by: STUDENT IN AN ORGANIZED HEALTH CARE EDUCATION/TRAINING PROGRAM

## 2025-07-16 PROCEDURE — 1160F RVW MEDS BY RX/DR IN RCRD: CPT | Mod: CPTII,S$GLB,, | Performed by: STUDENT IN AN ORGANIZED HEALTH CARE EDUCATION/TRAINING PROGRAM

## 2025-07-16 PROCEDURE — 3008F BODY MASS INDEX DOCD: CPT | Mod: CPTII,S$GLB,, | Performed by: STUDENT IN AN ORGANIZED HEALTH CARE EDUCATION/TRAINING PROGRAM

## 2025-07-16 PROCEDURE — 3074F SYST BP LT 130 MM HG: CPT | Mod: CPTII,S$GLB,, | Performed by: STUDENT IN AN ORGANIZED HEALTH CARE EDUCATION/TRAINING PROGRAM

## 2025-07-16 PROCEDURE — 1159F MED LIST DOCD IN RCRD: CPT | Mod: CPTII,S$GLB,, | Performed by: STUDENT IN AN ORGANIZED HEALTH CARE EDUCATION/TRAINING PROGRAM

## 2025-07-16 PROCEDURE — 99999 PR PBB SHADOW E&M-EST. PATIENT-LVL V: CPT | Mod: PBBFAC,,, | Performed by: STUDENT IN AN ORGANIZED HEALTH CARE EDUCATION/TRAINING PROGRAM

## 2025-07-16 RX ADMIN — LIDOCAINE HYDROCHLORIDE 0.5 MG: 10 INJECTION, SOLUTION EPIDURAL; INFILTRATION; INTRACAUDAL; PERINEURAL at 02:07

## 2025-07-16 RX ADMIN — TRIAMCINOLONE ACETONIDE 40 MG: 40 INJECTION, SUSPENSION INTRA-ARTICULAR; INTRAMUSCULAR at 02:07

## 2025-07-16 NOTE — PROGRESS NOTES
RHEUMATOLOGY OUTPATIENT CLINIC NOTE      Attending Rheumatologist: Naila Paulson  Primary Care Provider: Corey Blas MD   Physician Requesting Consultation: No referring provider defined for this encounter.  Chief Complaint/Reason For Consultation:  Follow-up    And ILD with positive DOMINIC  Subjective:       HPI  Nat Torres is a 64 y.o. White female with pmhx noted below referred for positive DOMINIC and RP in setting of ILD.  when came home from [facility].  Dr. Ríos because of a positive DOMINIC and a rash in arms and hands and monitoring then diagnosed with Fibromyalgia.  Never saw another Rheumatology  before she was going to have shoulder surgery and wanted to male sure no AI disease-- Dr. Abernathy no autoimmune disease and had Rotator cuff repair   3 yrs ago RP started -- lasts 1-2 hours and no inciting triggers and happens in both toes and fingers. No specific pattern.   Hands getting erythematous like a glove pattern down the wrist bilateral.     Aunt  of [place] -- not sure if lung problems   Cousin Myasthenia Gravis   Sister with CIDP on IVIG and doing well  Sister Fibromyalgia   2-3 years ago started with RP     2 yrs ago  - 62th birthday she was at a [employer] and she ws SOB -- cardiac work up- nothing like Pulm HTN   Had [facility] doctor he said pulm htn- aldactone   Dr. Ayala -- [facility] - no pulm htn   RP, livedo reticularis -- ? Prinzmetal angina, 2 DVT post op -- has IVC filter and baby aspirin to prevent micro Pes    Blood work form November - 2023:  APS negative    LV Patient here for follow up   Patient reports that she started taking the Nifedipine but since off Nebivolol she reports her smart watch is  reading from 60-120s.   She is still feeling SOB     2025-- Patient here fro follow up   Patient RP controlled   Needs Nebivolol refill   ILD not showing in CT chest done recently   Patient having hand pain and swelling with prolonged morning stiffness     2025:  Patient here for follow up   Patient still with RP but less with medication   Continues on Nebivolol  Patient with hand pain and stiffness   She is a but discourage because she feels she does not have a diagnosis     7/15/2025: Patient here for follo wup   Patient continues with RP but better with medication   Now she has erythromelalgia -- worsens with stress, dehydration  She also has thumb pain bilaterally and generalized body pain     Review of Systems   Constitutional:  Positive for unexpected weight change. Negative for fever.   HENT:  Negative for mouth sores and trouble swallowing.    Eyes:  Positive for redness.   Respiratory:  Positive for shortness of breath. Negative for cough.    Cardiovascular:  Negative for chest pain.   Gastrointestinal:  Positive for constipation. Negative for diarrhea.   Genitourinary:  Negative for dysuria and genital sores.   Integumentary:  Negative for rash.   Neurological:  Positive for headaches.   Hematological:  Bruises/bleeds easily.    Dry mouth and eyes   Pressure chest that limits breathing -- iron bra   No chemo therapy or radiation   Was anastrazole but did  not tolerate     Stiffness and pain but no joint swelling       Chronic comorbid conditions affecting medical decision making today:  Past Medical History:   Diagnosis Date    History of breast cancer 02/17/2024    History of deep venous thrombosis 02/17/2024    Hypothyroidism 02/17/2024    Peptic ulcer disease with hemorrhage 2013    Positive DOMINIC (antinuclear antibody) 1:160 speckled pattern 02/17/2024    Presence of IVC filter 02/17/2024    Primary hypertension 02/17/2024    Pulmonary hypertension 02/17/2024    Raynaud's disease without gangrene 02/17/2024     Past Surgical History:   Procedure Laterality Date    APPENDECTOMY      BILATERAL MASTECTOMY Bilateral     Breast cancer on right    BREAST SURGERY      DEN FILTER PLACEMENT      HYSTERECTOMY      LYSIS OF ADHESIONS      Removal of ovarian cyst       RIGHT HEART CATHETERIZATION Right 2/21/2024    Procedure: INSERTION, CATHETER, RIGHT HEART;  Surgeon: Lj Ayala Jr., MD;  Location: [facility] CATH LAB;  Service: Cardiology;  Laterality: Right;    ROTATOR CUFF REPAIR      WRIST SURGERY Right      No family history on file.  Social History     Substance and Sexual Activity   Alcohol Use Yes    Comment: 1-2 oz per day     Social History     Tobacco Use   Smoking Status Never   Smokeless Tobacco Never     Social History     Substance and Sexual Activity   Drug Use Never       Current Outpatient Medications:     amitriptyline (ELAVIL) 10 MG tablet, Take 1 tablet by mouth every evening., Disp: , Rfl:     amoxicillin (AMOXIL) 500 MG Tab, as needed., Disp: , Rfl:     aspirin (ECOTRIN) 81 MG EC tablet, Take 81 mg by mouth once daily., Disp: , Rfl:     azelastine (ASTELIN) 137 mcg (0.1 %) nasal spray, 1 spray by Nasal route once daily., Disp: , Rfl:     N8-wdxpfvh-ooswajqtv-acetylcys (EB-C3 DR) 1.7-6-2-600 mg CpDR, Take 1 capsule by mouth once daily., Disp: , Rfl:     biotin-keratin (BIOTIN PLUS KERATIN) 10,000-100 mcg-mg Tab, Take 1 tablet by mouth Daily., Disp: , Rfl:     budesonide (PULMICORT) 0.5 mg/2 mL nebulizer solution, Take by nebulization as needed., Disp: , Rfl:     buPROPion (WELLBUTRIN XL) 150 MG TB24 tablet, Take 1 tablet by mouth once daily., Disp: , Rfl:     calcium phosphate trib/vit D3 (CITRACAL + D3, CALCIUM PHOS, ORAL), Take 1 tablet by mouth Daily., Disp: , Rfl:     docusate sodium (COLACE) 100 MG capsule, as needed. (Patient taking differently: Take 100 mg by mouth once daily.), Disp: , Rfl:     famotidine (PEPCID) 40 MG tablet, Take 40 mg by mouth every morning., Disp: , Rfl:     fluticasone propionate (FLONASE ALLERGY RELIEF) 50 mcg/actuation nasal spray, 2 sprays by Each Nostril route Daily., Disp: , Rfl:     furosemide (LASIX) 20 MG tablet, Take 20 mg by mouth once daily., Disp: , Rfl:     hydroxychloroquine (PLAQUENIL) 200 mg tablet, Take 2  tablets (400 mg total) by mouth once daily., Disp: 60 tablet, Rfl: 11    levothyroxine (SYNTHROID) 100 MCG tablet, Take 1 tablet by mouth once daily., Disp: , Rfl:     LORazepam (ATIVAN) 1 MG tablet, Take 1 mg by mouth every 8 (eight) hours as needed for Anxiety., Disp: , Rfl:     mometasone (ASMANEX HFA) 100 mcg/actuation HFAA, Inhale 2 puffs into the lungs 2 (two) times a day. Controller, Disp: 13 g, Rfl: 11    nebivoloL (BYSTOLIC) 2.5 MG Tab, Take 1 tablet (2.5 mg total) by mouth once daily., Disp: 30 tablet, Rfl: 6    NIFEdipine (PROCARDIA-XL) 30 MG (OSM) 24 hr tablet, Take 1 tablet (30 mg total) by mouth once daily., Disp: 30 tablet, Rfl: 11    spironolactone (ALDACTONE) 25 MG tablet, Take 25 mg by mouth once daily., Disp: , Rfl:      Objective:         Vitals:    07/16/25 1405   BP: 107/76   Pulse: 82   Temp: 98.4 °F (36.9 °C)       Physical Exam   Constitutional: normal appearance.   HENT:   Head: Normocephalic.   Nose: Nose normal.   Mouth/Throat: Mucous membranes are moist.   Eyes: Conjunctivae are normal.   Cardiovascular: Normal rate and regular rhythm.   Pulmonary/Chest: Effort normal.   Musculoskeletal:         General: Tenderness present. No swelling. Normal range of motion.      Cervical back: Normal range of motion.   Neurological: She is alert.   Psychiatric: Mood normal.                         Reviewed old and all outside pertinent medical records available.    All lab results personally reviewed and interpreted by me.  Lab Results   Component Value Date    WBC 5.44 02/21/2024    HGB 14.1 02/21/2024    HCT 44.2 02/21/2024    MCV 89 02/21/2024    MCH 28.4 02/21/2024    MCHC 31.9 (L) 02/21/2024    RDW 12.5 02/21/2024     02/21/2024    MPV 8.7 (L) 02/21/2024       Lab Results   Component Value Date     02/21/2024    K 3.7 02/21/2024     02/21/2024    CO2 29 02/21/2024    GLU 77 02/21/2024    BUN 6 (L) 02/21/2024    CALCIUM 8.8 02/21/2024    PROT 7.2 01/11/2024    ALBUMIN 3.5  01/11/2024    BILITOT 0.9 01/11/2024    AST 18 01/11/2024    ALKPHOS 83 01/11/2024    ALT 10 01/11/2024         Imaging:  All imaging reviewed and independently interpreted by me.         ASSESSMENT / PLAN:     Nat Torres is a 64 y.o. White female with:      1. Sjogren's disease  2. Raynaud's disease without gangrene  3. Neuropathy   4. Livedo reticularis   5. Positive DOMINIC (antinuclear antibody)  6. Erythromelalgia   - Patient with keratoconjunctivitis sicca symptoms, unclear neuropathy etiology, arthralgia, fatigue, severe RP, - this seems to be more consistent with SjS -.   - Continue Nifedipine   - need to contact cardiology for HR control   - Pilocarpine for dry mouth if lifestyle modifications not enough   - ILD - had recent appt with Dr. Ayala - he suggested MMF(not in his note but per patient recollection) but new imaging with no evidence of ILD   - I suggested medication for inflammatory arthritis but wants to do more testing xray hand  - xray showing OA no RA   - Given info about erythromelalgia and ways to treat conservatively because medications for it can exacerbate RP     7. Family history of CREST syndrome  - CREST AB negative but this can also be a MCTD with some scleroderma features   - Ambulatory referral/consult to Rheumatology     8. Other specified counseling  - over 10 minutes spent regarding below topics:  - Immunization counseling done.  - Weight loss counseling done.  - Nutrition and exercise counseling.  - Limitation of alcohol consumption.  - Regular exercise:  Aerobic and resistance.  - Medication counseling provided.    9. Obesity  - would benefit from decreasing at least 10% of body weight.  - recommended goal of losing 1 lb per week.    10. DeQuervain tenosynovitis  - refer to procedure note   - CSI done today     No follow-ups on file.    Method of contact with patient concerns: Adryan attn Rheumatology    Disclaimer:  This note is prepared using voice recognition software  and as such is likely to have errors and has not been proof read. Please contact me for questions.     Today's visit is associated with current or anticipated ongoing medical care related to this patient's diagnosis of Sjogren's syndrome, which is a chronic disease which will require regular follow up to manage symptoms and progression. The patient is to return to the office within a minimum of 3-6 months to review symptoms and function at that time. CPT code       Naila Paulson M.D.  Rheumatology Department   [facility]

## 2025-07-17 RX ORDER — TRIAMCINOLONE ACETONIDE 40 MG/ML
40 INJECTION, SUSPENSION INTRA-ARTICULAR; INTRAMUSCULAR
Status: DISCONTINUED | OUTPATIENT
Start: 2025-07-16 | End: 2025-07-17 | Stop reason: HOSPADM

## 2025-07-17 RX ORDER — LIDOCAINE HYDROCHLORIDE 10 MG/ML
0.5 INJECTION, SOLUTION EPIDURAL; INFILTRATION; INTRACAUDAL; PERINEURAL
Status: DISCONTINUED | OUTPATIENT
Start: 2025-07-16 | End: 2025-07-17 | Stop reason: HOSPADM

## 2025-07-18 NOTE — PROCEDURES
Tendon Sheath    Date/Time: 7/16/2025 2:30 PM    Performed by: Naila Paulson MD  Authorized by: Naila Paulson MD    Consent Done?:  Yes (Verbal)  Indications:  Pain  Site marked: the procedure site was marked    Timeout: prior to procedure the correct patient, procedure, and site was verified    Local anesthesia used?: Yes    Local anesthetic:  Lidocaine spray  Location:  Thumb  Site:  R thumb extension tendon sheath and L thumb extension tendon sheath  Ultrasonic guidance for needle placement?: No    Needle size:  25 G  Approach:  Dorsal  Medications:  0.5 mg LIDOcaine (PF) 10 mg/ml (1%) 10 mg/mL (1 %); 40 mg triamcinolone acetonide 40 mg/mL  Patient tolerance:  Patient tolerated the procedure well with no immediate complications

## 2025-08-06 ENCOUNTER — HOSPITAL ENCOUNTER (OUTPATIENT)
Dept: PULMONOLOGY | Facility: HOSPITAL | Age: 65
Discharge: HOME | End: 2025-08-06
Attending: INTERNAL MEDICINE
Payer: COMMERCIAL

## 2025-08-06 DIAGNOSIS — J84.9 ILD (INTERSTITIAL LUNG DISEASE): ICD-10-CM

## 2025-08-06 LAB
DLCO ADJ PRE: 16.96 ML/(MIN*MMHG) (ref 15.48–26.95)
DLCO SINGLE BREATH LLN: 15.48
DLCO SINGLE BREATH PRE REF: 82.5 %
DLCO SINGLE BREATH REF: 21.21
DLCOC SBVA LLN: 3.02
DLCOC SBVA PRE REF: 87.1 %
DLCOC SBVA REF: 4.61
DLCOC SINGLE BREATH LLN: 15.48
DLCOC SINGLE BREATH PRE REF: 80 %
DLCOC SINGLE BREATH REF: 21.21
DLCOCSBVAULN: 6.2
DLCOCSINGLEBREATHULN: 26.95
DLCOCSINGLEBREATHZSCORE: -1.22
DLCOSINGLEBREATHULN: 26.95
DLCOSINGLEBREATHZSCORE: -1.06
DLCOVA LLN: 3.02
DLCOVA PRE REF: 89.9 %
DLCOVA PRE: 4.14 ML/(MIN*MMHG*L) (ref 3.02–6.2)
DLCOVA REF: 4.61
DLCOVAULN: 6.2
DLVAADJ PRE: 4.01 ML/(MIN*MMHG*L) (ref 3.02–6.2)
ERV LLN: -16449.25
ERV PRE REF: 55.7 %
ERV REF: 0.75
ERVULN: ABNORMAL
FEF 25 75 LLN: 1.02
FEF 25 75 PRE REF: 171.1 %
FEF 25 75 REF: 2.06
FET100 CHG: 2.6 %
FEV05 LLN: 0.86
FEV05 REF: 1.72
FEV1 CHG: 4 %
FEV1 FVC LLN: 67
FEV1 FVC PRE REF: 108.3 %
FEV1 FVC REF: 79
FEV1 LLN: 1.69
FEV1 PRE REF: 108 %
FEV1 REF: 2.26
FEV1 VOL CHG: 0.1
FRCPLETH LLN: 1.77
FRCPLETH PREREF: 68.9 %
FRCPLETH REF: 2.59
FRCPLETHULN: 3.41
FVC CHG: -0.1 %
FVC LLN: 2.15
FVC PRE REF: 99.1 %
FVC REF: 2.86
FVC VOL CHG: -0
IVC PRE: 2.89 L (ref 2.15–3.61)
IVC SINGLE BREATH LLN: 2.15
IVC SINGLE BREATH PRE REF: 100.7 %
IVC SINGLE BREATH REF: 2.86
IVCSINGLEBREATHULN: 3.61
LLN IC: -16448.18
PEF LLN: 4.27
PEF PRE REF: 90.2 %
PEF REF: 5.88
POST FEF 25 75: 3.94 L/S (ref 1.02–3.46)
POST FET 100: 6.58 SEC
POST FEV1 FVC: 89.36 % (ref 66.88–89.87)
POST FEV1: 2.53 L (ref 1.69–2.8)
POST FEV5: 2.13 L (ref 0.86–2.57)
POST FVC: 2.84 L (ref 2.15–3.61)
POST PEF: 6.17 L/S (ref 4.27–7.48)
PRE DLCO: 17.51 ML/(MIN*MMHG) (ref 15.48–26.95)
PRE ERV: 0.42 L (ref -16449.25–16450.75)
PRE FEF 25 75: 3.52 L/S (ref 1.02–3.46)
PRE FET 100: 6.41 SEC
PRE FEV05 REF: 118.7 %
PRE FEV1 FVC: 85.86 % (ref 66.88–89.87)
PRE FEV1: 2.44 L (ref 1.69–2.8)
PRE FEV5: 2.04 L (ref 0.86–2.57)
PRE FRC PL: 1.78 L (ref 1.77–3.41)
PRE FVC: 2.84 L (ref 2.15–3.61)
PRE IC: 2.47 L (ref -16448.18–16451.82)
PRE PEF: 5.3 L/S (ref 4.27–7.48)
PRE REF IC: 135.4 %
PRE RV: 1.37 L (ref 1.27–2.42)
PRE TLC: 4.25 L (ref 3.62–5.59)
RAW PRE REF: 65.5 %
RAW PRE: 2 CMH2O*S/L (ref 3.06–3.06)
RAW REF: 3.06
REF IC: 1.82
RV LLN: 1.27
RV PRE REF: 74.3 %
RV REF: 1.84
RVTLC LLN: 30
RVTLC PRE REF: 80.4 %
RVTLC PRE: 32.17 % (ref 30.45–49.63)
RVTLC REF: 40
RVTLCULN: 50
RVULN: 2.42
SGAW PRE REF: 218.1 %
SGAW PRE: 0.22 1/(CMH2O*S) (ref 0.1–0.1)
SGAW REF: 0.1
TLC LLN: 3.62
TLC PRE REF: 92.4 %
TLC REF: 4.6
TLC ULN: 5.59
ULN IC: ABNORMAL
VA PRE: 4.22 L (ref 4.45–4.45)
VA SINGLE BREATH LLN: 4.45
VA SINGLE BREATH PRE REF: 94.9 %
VA SINGLE BREATH REF: 4.45
VASINGLEBREATHULN: 4.45
VC LLN: 2.15
VC PRE REF: 100.7 %
VC PRE: 2.89 L (ref 2.15–3.61)
VC REF: 2.86
VC ULN: 3.61

## 2025-08-06 PROCEDURE — 94060 EVALUATION OF WHEEZING: CPT

## 2025-08-06 PROCEDURE — 94726 PLETHYSMOGRAPHY LUNG VOLUMES: CPT

## 2025-08-06 PROCEDURE — 94729 DIFFUSING CAPACITY: CPT

## 2025-08-13 ENCOUNTER — OFFICE VISIT (OUTPATIENT)
Dept: PULMONOLOGY | Facility: CLINIC | Age: 65
End: 2025-08-13
Payer: COMMERCIAL

## 2025-08-13 VITALS
BODY MASS INDEX: 30.43 KG/M2 | DIASTOLIC BLOOD PRESSURE: 86 MMHG | OXYGEN SATURATION: 96 % | SYSTOLIC BLOOD PRESSURE: 127 MMHG | HEIGHT: 63 IN | WEIGHT: 171.75 LBS | HEART RATE: 83 BPM

## 2025-08-13 DIAGNOSIS — R76.8 POSITIVE ANA (ANTINUCLEAR ANTIBODY): ICD-10-CM

## 2025-08-13 DIAGNOSIS — J84.9 ILD (INTERSTITIAL LUNG DISEASE): Primary | ICD-10-CM

## 2025-08-13 DIAGNOSIS — I27.9 CHRONIC PULMONARY HEART DISEASE: ICD-10-CM

## 2025-08-13 PROCEDURE — 1159F MED LIST DOCD IN RCRD: CPT | Mod: CPTII,S$GLB,, | Performed by: INTERNAL MEDICINE

## 2025-08-13 PROCEDURE — 3074F SYST BP LT 130 MM HG: CPT | Mod: CPTII,S$GLB,, | Performed by: INTERNAL MEDICINE

## 2025-08-13 PROCEDURE — 99214 OFFICE O/P EST MOD 30 MIN: CPT | Mod: S$GLB,,, | Performed by: INTERNAL MEDICINE

## 2025-08-13 PROCEDURE — G2211 COMPLEX E/M VISIT ADD ON: HCPCS | Mod: S$GLB,,, | Performed by: INTERNAL MEDICINE

## 2025-08-13 PROCEDURE — 99999 PR PBB SHADOW E&M-EST. PATIENT-LVL IV: CPT | Mod: PBBFAC,,, | Performed by: INTERNAL MEDICINE

## 2025-08-13 PROCEDURE — 3079F DIAST BP 80-89 MM HG: CPT | Mod: CPTII,S$GLB,, | Performed by: INTERNAL MEDICINE

## 2025-08-13 PROCEDURE — 3008F BODY MASS INDEX DOCD: CPT | Mod: CPTII,S$GLB,, | Performed by: INTERNAL MEDICINE

## 2025-08-13 RX ORDER — TIOTROPIUM BROMIDE INHALATION SPRAY 1.56 UG/1
2 SPRAY, METERED RESPIRATORY (INHALATION) DAILY
Qty: 12 G | Refills: 3 | Status: SHIPPED | OUTPATIENT
Start: 2025-08-13

## 2025-08-19 DIAGNOSIS — J84.9 ILD (INTERSTITIAL LUNG DISEASE): ICD-10-CM

## 2025-08-19 DIAGNOSIS — M35.02 SJOGREN'S SYNDROME WITH LUNG INVOLVEMENT: ICD-10-CM

## 2025-08-19 DIAGNOSIS — I27.9 CHRONIC PULMONARY HEART DISEASE: ICD-10-CM

## 2025-08-19 RX ORDER — NEBIVOLOL 2.5 MG/1
2.5 TABLET ORAL DAILY
Qty: 30 TABLET | Refills: 6 | Status: SHIPPED | OUTPATIENT
Start: 2025-08-19

## 2025-08-19 RX ORDER — HYDROXYCHLOROQUINE SULFATE 200 MG/1
400 TABLET, FILM COATED ORAL DAILY
Qty: 60 TABLET | Refills: 11 | Status: SHIPPED | OUTPATIENT
Start: 2025-08-19

## (undated) DEVICE — SHEATH INTRODUCER 7FR 11CM

## (undated) DEVICE — INTRODUCER RX PSI KIT 8.5 FR

## (undated) DEVICE — TRANSDUCER ADULT DISP

## (undated) DEVICE — CATH SWAN GANZ STND 7FR

## (undated) DEVICE — TRAY CATH LAB OMC

## (undated) DEVICE — KIT MINI STICK MAX INTRO 5FR

## (undated) DEVICE — COVER PROBE US 5.5X58L NON LTX